# Patient Record
Sex: MALE | Race: WHITE | URBAN - METROPOLITAN AREA
[De-identification: names, ages, dates, MRNs, and addresses within clinical notes are randomized per-mention and may not be internally consistent; named-entity substitution may affect disease eponyms.]

---

## 2017-01-01 ENCOUNTER — HOME CARE/HOSPICE - HEALTHEAST (OUTPATIENT)
Dept: HOSPICE | Facility: HOSPICE | Age: 57
End: 2017-01-01

## 2017-01-01 ENCOUNTER — RECORDS - HEALTHEAST (OUTPATIENT)
Dept: ADMINISTRATIVE | Facility: OTHER | Age: 57
End: 2017-01-01

## 2017-01-01 ENCOUNTER — AMBULATORY - HEALTHEAST (OUTPATIENT)
Dept: HOSPICE | Facility: HOSPICE | Age: 57
End: 2017-01-01

## 2017-01-01 ENCOUNTER — HOSPITAL ENCOUNTER (INPATIENT)
Dept: INTENSIVE CARE | Facility: CLINIC | Age: 57
Discharge: HOSPICE | End: 2017-11-01
Attending: FAMILY MEDICINE | Admitting: FAMILY MEDICINE

## 2017-01-01 DIAGNOSIS — I82.409 DVT (DEEP VENOUS THROMBOSIS) (H): ICD-10-CM

## 2017-01-01 DIAGNOSIS — A41.9 SEVERE SEPSIS (H): ICD-10-CM

## 2017-01-01 DIAGNOSIS — G90.3 NEUROGENIC ORTHOSTATIC HYPOTENSION (H): ICD-10-CM

## 2017-01-01 DIAGNOSIS — R12 HEART BURN: ICD-10-CM

## 2017-01-01 DIAGNOSIS — R65.20 SEVERE SEPSIS (H): ICD-10-CM

## 2017-01-01 DIAGNOSIS — R68.89 SUSPECTED DEEP TISSUE INJURY: ICD-10-CM

## 2017-01-01 DIAGNOSIS — G89.3 CANCER RELATED PAIN: ICD-10-CM

## 2017-01-01 DIAGNOSIS — Z51.5 PALLIATIVE CARE ENCOUNTER: ICD-10-CM

## 2017-01-01 DIAGNOSIS — A41.9 SEPSIS (H): ICD-10-CM

## 2017-01-01 LAB
ATRIAL RATE - MUSE: 103 BPM
BASOPHILS # BLD AUTO: 0 THOU/UL (ref 0–0.2)
BASOPHILS NFR BLD AUTO: 0 % (ref 0–2)
BLD PROD TYP BPU: NORMAL
BLD PROD TYP BPU: NORMAL
BLOOD EXPIRATION DATE: NORMAL
BLOOD EXPIRATION DATE: NORMAL
BLOOD TYPE: 6200
BLOOD TYPE: 6200
CODING SYSTEM: NORMAL
CODING SYSTEM: NORMAL
COMPONENT (HISTORICAL CONVERSION): NORMAL
COMPONENT (HISTORICAL CONVERSION): NORMAL
CROSSMATCH: NORMAL
CROSSMATCH: NORMAL
DIASTOLIC BLOOD PRESSURE - MUSE: 71 MMHG
EOSINOPHIL # BLD AUTO: 0.1 THOU/UL (ref 0–0.4)
EOSINOPHIL NFR BLD AUTO: 3 % (ref 0–6)
ERYTHROCYTE [DISTWIDTH] IN BLOOD BY AUTOMATED COUNT: 20.1 % (ref 11–14.5)
HCT VFR BLD AUTO: 18.8 % (ref 40–54)
HGB BLD-MCNC: 6.2 G/DL (ref 14–18)
INTERPRETATION ECG - MUSE: NORMAL
ISSUE DATE AND TIME: NORMAL
ISSUE DATE AND TIME: NORMAL
LAB AP CHARGES (HE HISTORICAL CONVERSION): NORMAL
LYMPHOCYTES # BLD AUTO: 0.2 THOU/UL (ref 0.8–4.4)
LYMPHOCYTES NFR BLD AUTO: 8 % (ref 20–40)
MCH RBC QN AUTO: 30.4 PG (ref 27–34)
MCHC RBC AUTO-ENTMCNC: 33 G/DL (ref 32–36)
MCV RBC AUTO: 92 FL (ref 80–100)
MONOCYTES # BLD AUTO: 0.2 THOU/UL (ref 0–0.9)
MONOCYTES NFR BLD AUTO: 7 % (ref 2–10)
NEUTROPHILS # BLD AUTO: 2.2 THOU/UL (ref 2–7.7)
NEUTROPHILS NFR BLD AUTO: 82 % (ref 50–70)
P AXIS - MUSE: 49 DEGREES
PATH REPORT.COMMENTS IMP SPEC: NORMAL
PATH REPORT.COMMENTS IMP SPEC: NORMAL
PATH REPORT.FINAL DX SPEC: NORMAL
PATH REPORT.MICROSCOPIC SPEC OTHER STN: ABNORMAL
PATH REPORT.MICROSCOPIC SPEC OTHER STN: NORMAL
PLATELET # BLD AUTO: 76 THOU/UL (ref 140–440)
PMV BLD AUTO: 11.5 FL (ref 8.5–12.5)
PR INTERVAL - MUSE: 122 MS
QRS DURATION - MUSE: 84 MS
QT - MUSE: 300 MS
QTC - MUSE: 393 MS
R AXIS - MUSE: 46 DEGREES
RBC # BLD AUTO: 2.04 MILL/UL (ref 4.4–6.2)
STATUS (HISTORICAL CONVERSION): NORMAL
STATUS (HISTORICAL CONVERSION): NORMAL
SYSTOLIC BLOOD PRESSURE - MUSE: 100 MMHG
T AXIS - MUSE: 36 DEGREES
UNIT ABO/RH (HISTORICAL CONVERSION): NORMAL
UNIT ABO/RH (HISTORICAL CONVERSION): NORMAL
UNIT NUMBER: NORMAL
UNIT NUMBER: NORMAL
VENTRICULAR RATE- MUSE: 103 BPM
WBC: 2.9 THOU/UL (ref 4–11)

## 2017-01-01 RX ORDER — HALOPERIDOL 2 MG/ML
0.5-2 SOLUTION ORAL EVERY 4 HOURS PRN
Status: SHIPPED | COMMUNITY
Start: 2017-01-01

## 2017-01-01 RX ORDER — AMOXICILLIN 250 MG
1 CAPSULE ORAL 2 TIMES DAILY PRN
Status: SHIPPED | COMMUNITY
Start: 2017-01-01

## 2017-01-01 RX ORDER — FENTANYL 75 UG/1
1 PATCH TRANSDERMAL
Status: SHIPPED | COMMUNITY
Start: 2017-01-01

## 2017-01-01 RX ORDER — HYDROMORPHONE HYDROCHLORIDE 1 MG/ML
4 SOLUTION ORAL
Status: SHIPPED | COMMUNITY
Start: 2017-01-01

## 2017-01-01 RX ORDER — ATROPINE SULFATE 10 MG/ML
1-2 SOLUTION/ DROPS OPHTHALMIC EVERY 4 HOURS PRN
Status: SHIPPED | COMMUNITY
Start: 2017-01-01

## 2017-01-01 RX ORDER — LORAZEPAM 2 MG/ML
1 CONCENTRATE ORAL
Status: SHIPPED | COMMUNITY
Start: 2017-01-01

## 2017-01-01 RX ORDER — BISACODYL 10 MG
10 SUPPOSITORY, RECTAL RECTAL PRN
Status: SHIPPED | COMMUNITY
Start: 2017-01-01

## 2017-01-01 RX ORDER — HYDROMORPHONE HYDROCHLORIDE 1 MG/ML
8 SOLUTION ORAL EVERY 4 HOURS
Status: SHIPPED | COMMUNITY
Start: 2017-01-01

## 2017-01-01 RX ORDER — LORAZEPAM 2 MG/ML
1 CONCENTRATE ORAL EVERY 4 HOURS
Status: SHIPPED | COMMUNITY
Start: 2017-01-01

## 2017-01-01 ASSESSMENT — MIFFLIN-ST. JEOR
SCORE: 1468.97
SCORE: 1366.92
SCORE: 1492.56
SCORE: 1487.11
SCORE: 1408.19

## 2021-05-31 VITALS — BODY MASS INDEX: 23.32 KG/M2 | HEIGHT: 65 IN | WEIGHT: 140 LBS

## 2021-05-31 VITALS — BODY MASS INDEX: 29.23 KG/M2 | HEIGHT: 64 IN | WEIGHT: 171.2 LBS

## 2021-06-13 NOTE — PROGRESS NOTES
"      Health system PALLIATIVE CARE PROGRESS NOTE    PATIENT NAME: Shoaib Parish  MRN #: [id number]  DATE OF ADMISSION: 10/29/2017   DATE OF ENCOUNTER: 10/31/2017   REQUESTING PHYSICIAN: Dr. Mackey  PRIMARY CARE PROVIDER: No Primary Care Provider  CONSULTANT: Lashanda Villeda, CNS  VISIT #: [id number]       Impression and Recommendations:  1.  Cancer related pain -patient currently denies all pain, sensation  -Continue home dosing of fentanyl 75 mcg every 72 hours via patch  -Decadron 4 mg p.o. 3 times daily  -Gabapentin 300 mg p.o. twice daily  -If he discharges with hospice, consider rotation from fentanyl patch to methadone.  Fentanyl patch is not covered by hospice.  Was previously on OxyContin and pain was not adequately managed.  Will readdress this tomorrow, based on overall goals/plan     2.  Shortness of breath - respiratory failure, hypoxia and metastasis  -Currently on IV Levaquin  -Oxygen for comfort     3.  Bowel regimen-patient on scheduled opiates  -Charla-Colace 1 tab p.o. twice daily     4.  Palliative care encounter-please see discussion below  -DNR/DNI  -Continue current cares and treatments  -Hospice is following.  Patient and spouse are waiting to hear back from their oncologist regarding her recommendations.  They left her message today and anticipate that she will call back at some point today.  If patient stabilizes enough to discharge, likely will sign on with hospice at discharge.  -Continue to  patient and family and determine goals of care       ADMITTING DIAGNOSIS: Sepsis    CHIEF COMPLAINT: sepsis    SUMMARY: Chart reviewed. Per Lizzy Conde's consult note from 10/30/17, \"Shoaib Parish is a 57 y.o. Male admitted for sepsis and respiratory distress. Mr. 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 His last chemotherapy (Pembrolizumab) was 3 days ago and he also received 3 units PRBCs for anemia secondary to chemo at [facility]. The " "patient presented to the ED 10/29/17 with shortness of breath and difficulty breathing. His wife, Humaira, also reports he appeared to be \"gurgling\" and quite weak. The patient was found to be hypotensive and tachycardic with a temp of 101.3. He was admitted to the ICU with severe sepsis and acute respiratory distress.  CT Chest showed \"evidence for extensive metastatic disease within the supraclavicular region left aspect of the neck, chest, likely bones and liver.\"\"                PATIENT GOALS OF CARE: Patient lying in bed during discussion, periodically appeared to be sleeping.  His wife and brothers Jairo and Jason are present.  Questions were answered regarding his current condition.  We discussed their wishes for care moving forward.  At this time, request is to continue current level of care.  We did discuss the possibility that his condition could decline further while in the hospital and what they would want if this happened.  For example, restarting IV vasopressor medications.  They want to discuss this privately.  We discussed continuing with aggressive treatment as well as transitioning to comfort care and discharging with hospice.  They are waiting to hear back from his oncologist to get her recommendations.  Wife verbalized that oncologist has discussed with them that continuing treatment may cause more harm than good and they are aware that he is likely reaching that point.  We did discuss hospice philosophy and services in detail as the hospice nurse was also present.  They are considering hospice care at home and starting to mentally prepare for this, but at this point are still discussing the most appropriate time to transition to comfort care and discharge with hospice.  Outside the room his brother Jason asked about prognosis and when his mother should visit from [place].  Currently the plan is for her to fly to [place] on Friday.  I did encourage him to have her come to see him as soon as " "possible, as he is very ill and his condition could change at any time.    REVIEW OF SYMPTOMS:  Patient denies pain at this time.  Denies chest pain or shortness of breath, denies nausea or vomiting.  No constipation or diarrhea.  Feels tired and fatigued, difficulty sleeping in the hospital.    ESAS Score:    Depression: None  Nausea: None   Anorexia:  None  Drowsiness:  Mild  Fatigue:  Moderate  Constipation:  None  Dyspnea: None  Agitation:  None  Anxiety:  None  Pain:  None    PAIN SCORE: 0/10  PPS:   30%- 1. Totally bed bound; 2. Unable to do any activity, extensive disease; 3. Total care; 4. Normal or reduced; 5. Full or drowsy +/- confusion    PHYSICAL EXAMINATION:    /71  Pulse 89  Temp 98.2  F (36.8  C) (Oral)   Resp 16  Ht 5' 4\" (1.626 m)  Wt 170 lb (77.1 kg)  SpO2 95%  BMI 29.18 kg/m2  General appearance: alert, appears stated age, cooperative, fatigued and no distress  Head: Normocephalic, without obvious abnormality, atraumatic  Eyes: negative, conjunctivae/corneas clear. PERRL, EOM's intact. Fundi benign.  Nose: Nares normal. Septum midline. Mucosa normal. No drainage or sinus tenderness.  Throat: lips, mucosa, and tongue normal; teeth and gums normal  Neck: no adenopathy, no carotid bruit, no JVD, supple, symmetrical, trachea midline and thyroid not enlarged, symmetric, no tenderness/mass/nodules  Lungs: CTA  Heart: regular rate and rhythm, S1, S2 normal, no murmur, click, rub or gallop  Abdomen: soft, non-tender; bowel sounds normal; no masses,  no organomegaly  Pulses: radial pulses palpable +2  Neurologic: Paraplegia; pt reports no sensation from chest down      LAB:    Results from last 7 days  Lab Units 10/30/17  0755 10/29/17  0148   LN-WHITE BLOOD CELL COUNT thou/uL 2.7* 2.7*   LN-HEMOGLOBIN g/dL 7.2* 8.6*   LN-HEMATOCRIT % 21.6* 25.3*   LN-PLATELET COUNT thou/uL 66* 68*          Results from last 7 days  Lab Units 10/31/17  0446 10/30/17  2059 10/30/17  0755 10/29/17  0148 "   LN-SODIUM mmol/L 139  --  142 133*   LN-POTASSIUM mmol/L 4.1  4.1 4.3 3.0* 4.3   LN-CHLORIDE mmol/L 111*  --  113* 101   LN-CO2 mmol/L 21*  --  20* 22   LN-BLOOD UREA NITROGEN mg/dL 11  --  9 22   LN-CREATININE mg/dL 0.42*  --  0.42* 0.52*   LN-CALCIUM mg/dL 8.2*  --  8.4* 8.9   LN-ALBUMIN g/dL  --   --   --  1.9*   LN-PROTEIN TOTAL g/dL  --   --   --  4.8*   LN-BILIRUBIN TOTAL mg/dL  --   --   --  0.9   LN-ALKALINE PHOSPHATASE U/L  --   --   --  156*   LN-ALT (SGPT) U/L  --   --   --  54*   LN-AST (SGOT) U/L  --   --   --  33         Results from last 7 days  Lab Units 10/29/17  0148   LN-INR  1.18*         I have personally reviewed all pertinent labs.    RADIOLOGIC FINDINGS:  No new results    Lashanda Villeda, [facility]  Office #: [phone]    E/M 35 minutes with > 50% of time during encounter was spent with family and patient on counseling and/or care coordination as documented above. yes

## 2021-06-13 NOTE — PROGRESS NOTES
[facility] PALLIATIVE CARE PROGRESS NOTE    PATIENT NAME: Shoaib Parish  MRN #: [id number]  DATE OF ADMISSION: 10/29/2017   DATE OF ENCOUNTER: 11/1/2017   REQUESTING PHYSICIAN: Dr. Mackey  PRIMARY CARE PROVIDER: No Primary Care Provider  CONSULTANT: Lashanda Villeda, CNS  VISIT #: [id number]    Impression and Recommendations:  1.  Cancer related pain -patient currently denies all pain, sensation  -Continue home dosing of fentanyl 75 mcg every 72 hours via patch and oxycodone 10-20 mg po every 2 hours PRN.   -Decadron 4 mg p.o. 3 times daily.  -Gabapentin 300 mg p.o. twice daily.  -Per wife, patient has a supply of the above medications at home, including at least 8 fentanyl patches.  At this time they would prefer to continue current pain management regimen.  If needing to transition off of fentanyl patch in the future, consider methadone.     2.  Shortness of breath - respiratory failure, hypoxia and metastasis  -Transitioning to oral antibiotics, would like to continue on hospice  -Oxygen for comfort.  Currently not on oxygen  -Opioid medications can also be helpful with shortness of breath.      3.  Bowel regimen-patient on scheduled opiates  -Last bowel movement was last evening.  Has been having more loose stools while on antibiotics.  -Charla-Colace 1 tab p.o. twice daily.  Hold if having loose stools.      4.  Palliative care encounter-please see discussion below  -DNR/DNI  -Discharge to home with hospice services today.  Hospice admission RN is coordinating discharge plan with care management and patient/family.  -Patient and spouse would like to have him continue on Midodrin and Levaquin oral at home.  Recommend discontinuing Lovenox.    These recommendations were discussed with Dr. Mackey.    Barriers to discharge: None  The patient is eligible for discharge from a palliative standpoint at this time.    ADMITTING DIAGNOSIS: Sepsis    CHIEF COMPLAINT: Sepsis    SUMMARY: Chart reviewed. Deandre Ball  "Amaya's consult note from 10/30/17, \"Shoaib Parish is a 57 y.o. Male admitted for sepsis and respiratory distress. Mr. 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 His last chemotherapy (Pembrolizumab) was 3 days ago and he also received 3 units PRBCs for anemia secondary to chemo at [facility]. The patient presented to the ED 10/29/17 with shortness of breath and difficulty breathing. His wife, uHmaira, also reports he appeared to be \"gurgling\" and quite weak. The patient was found to be hypotensive and tachycardic with a temp of 101.3. He was admitted to the ICU with severe sepsis and acute respiratory distress.  CT Chest showed \"evidence for extensive metastatic disease within the supraclavicular region left aspect of the neck, chest, likely bones and liver.\"\"         PATIENT GOALS OF CARE:   Met with patient and his spouse.  After discussions with Dr. Mackey and amongst each other, patient has decided to discharge to home with hospice services.  He verbalized that cancer therapies likely would do more harm than good.  Discussed discharging with hospice today and they plan to meet with hospice admission nurse shortly.  They want to continue with midodrine and oral antibiotic at home.  We also discussed pain management regimen in detail and would like to continue with current plan.  They do have a supply of all of his comfort medications at home, including fentanyl patch.  We discussed that hospice can help with adjust/titrate medications if needed.      REVIEW OF SYMPTOMS:  Patient denies pain at this time.  Denies chest pain or shortness of breath, denies nausea or vomiting.  No constipation or diarrhea.  Feels tired and fatigued, difficulty sleeping in the hospital.     ESAS Score:    Depression: None  Nausea: None   Anorexia:  None  Drowsiness:  Mild  Fatigue:  Moderate  Constipation:  None  Dyspnea: None  Agitation:  None  Anxiety:  None  Pain:  None     PAIN " "SCORE: 0/10  PPS:   30%- 1. Totally bed bound; 2. Unable to do any activity, extensive disease; 3. Total care; 4. Normal or reduced; 5. Full or drowsy +/- confusion    PHYSICAL EXAMINATION:    /71 (Patient Position: Lying)  Pulse 89  Temp 99.9  F (37.7  C) (Oral)   Resp 18  Ht 5' 4\" (1.626 m)  Wt 171 lb 3.2 oz (77.7 kg)  SpO2 91%  BMI 29.39 kg/m2  General appearance: alert, appears stated age, cooperative, fatigued and no distress  Head: Normocephalic, without obvious abnormality, atraumatic  Eyes: negative, conjunctivae/corneas clear. PERRL, EOM's intact. Fundi benign.  Nose: Nares normal. Septum midline. Mucosa normal. No drainage or sinus tenderness.  Throat: lips, mucosa, and tongue normal; teeth and gums normal  Neck: no adenopathy, no carotid bruit, no JVD, supple, symmetrical, trachea midline and thyroid not enlarged, symmetric, no tenderness/mass/nodules  Lungs: CTA  Heart: regular rate and rhythm, S1, S2 normal, no murmur, click, rub or gallop  Abdomen: soft, non-tender; bowel sounds normal; no masses,  no organomegaly  Pulses: radial pulses palpable +2  Neurologic: Paraplegia; pt reports no sensation from chest down    LAB:    Results from last 7 days  Lab Units 10/31/17  1309 10/31/17  1018 10/30/17  0755   LN-WHITE BLOOD CELL COUNT thou/uL 2.9* 2.9* 2.7*   LN-HEMOGLOBIN g/dL 6.2* 6.5* 7.2*   LN-HEMATOCRIT % 18.8* 19.7* 21.6*   LN-PLATELET COUNT thou/uL 76* 73* 66*          Results from last 7 days  Lab Units 10/31/17  1309 10/31/17  0446 10/30/17  2059 10/30/17  0755 10/29/17  0148   LN-SODIUM mmol/L  --  139  --  142 133*   LN-POTASSIUM mmol/L  --  4.1  4.1 4.3 3.0* 4.3   LN-CHLORIDE mmol/L  --  111*  --  113* 101   LN-CO2 mmol/L  --  21*  --  20* 22   LN-BLOOD UREA NITROGEN mg/dL  --  11  --  9 22   LN-CREATININE mg/dL  --  0.42*  --  0.42* 0.52*   LN-CALCIUM mg/dL  --  8.2*  --  8.4* 8.9   LN-ALBUMIN g/dL  --   --   --   --  1.9*   LN-PROTEIN TOTAL g/dL  --   --   --   --  4.8* "   LN-BILIRUBIN TOTAL mg/dL 0.5  --   --   --  0.9   LN-ALKALINE PHOSPHATASE U/L  --   --   --   --  156*   LN-ALT (SGPT) U/L  --   --   --   --  54*   LN-AST (SGOT) U/L  --   --   --   --  33         Results from last 7 days  Lab Units 10/29/17  0148   LN-INR  1.18*         I have personally reviewed all pertinent labs.    RADIOLOGIC FINDINGS:  No new results    Lashanda Villeda, [facility]  Office #: [phone]    E/M 35 minutes with > 50% of time during encounter was spent with family and patient on counseling and/or care coordination as documented above. yes    Prolonged service time beyond 35 minutes was from 10:30 to 11:05, which included discussion about those of care and symptom management.

## 2021-06-13 NOTE — CONSULTS
S: hospice  B: writer and Christy NP from [facility] met with pt, wife Humaira and 2 brothers. Wife beginning to see the graveness of pt illness. Pt and brothers continue to talk about wanting chemo and trying to continue. Pt is receiving 2nd unit of PRBC , /50 resting in bed with oral presser support. Family wants to take pt home when he is medically stable. Answered hospice questions.  A: pt bedbound, has difficulty keeping eyes open for conversation.   R: plan to have hospice follow pt when medically ready for discharge.

## 2021-06-13 NOTE — ED PROVIDER NOTES
eMERGENCY dEPARTMENT eNCOUnter      CHIEF COMPLAINT    Chief Complaint   Patient presents with     Shortness of Breath       HPI    Shoaib Parish is a 57 y.o. male who presents to the ED for evaluation of difficulty breathing which started a few hours prior to his presentation to the emergency department.  His wife noted that his breathing sounded very gurgly.  The patient has a history of metastatic sarcomatoid cancer.  His oncologists are at [facility].  His last chemotherapy was one month ago.  He received an immunosuppressant therapy yesterday.  He was admitted to the [facility] on October 26 for anemia with a hemoglobin of 6.3 and received 3 units of blood.  He was discharged on October 27.  He was started on Levaquin last night due to his fever.  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    DOCUSATE SODIUM (COLACE) 100 MG CAPSULE    Take 100 mg by mouth 2 (two) times a day as needed for constipation.    ENOXAPARIN (LOVENOX) 100 MG/ML SYRG    Inject 100 mg under the skin daily.    FENTANYL (DURAGESIC) 75 MCG/HR    Place 1 patch on the skin every third day.    GABAPENTIN (NEURONTIN) 300 MG CAPSULE    Take 300 mg by mouth 2 (two) times a day.    LEVOFLOXACIN (LEVAQUIN) 500 MG TABLET    Take 500 mg by mouth daily.    LORAZEPAM (ATIVAN) 0.5 MG TABLET    Take 0.5 mg by mouth every 4 (four) hours. 0.5-1.0 mg every 4 hours as needed for anxiety    OXYCODONE (ROXICODONE) 10 MG IMMEDIATE RELEASE TABLET    Take 10-20 mg by mouth every 2 (two) hours as needed for pain.    POLYETHYLENE GLYCOL (MIRALAX) 17 GRAM PACKET    Take 17 g by mouth daily.    PROCHLORPERAZINE (COMPAZINE) 10 MG TABLET    Take 10 mg by mouth every 4 (four) hours as needed for nausea  (every 4-6 hours as needed).    SENNA-DOCUSATE (SENNOSIDES-DOCUSATE SODIUM) 8.6-50 MG TABLET    Take 1 tablet by mouth 2 (two) times a day as needed for constipation.   Modified Medications    No medications on file   Discontinued Medications    No medications on file     Reviewed and nothing pertinent.     ALLERGIES    No Known Allergies    SOCIAL HISTORY     Lives with wife    REVIEW OF SYSTEMS    Constitutional:  Denies fever, chills, excessive weight loss  Eyes:  Denies any vision changes  HENT: Denies sore throat.  Respiratory:  shortness of breath   Cardiovascular:  Denies chest pain or palpitations  GI:  Denies abdominal pain, nausea, vomiting, or change in bowel habits.  : Denies hematuria or dysuria.   Musculoskeletal:  Denies any new muscle/joint pain.    Skin:  Denies rash   Neurologic:  Denies headache, focal weakness or sensory changes   Psych: Mood and affect normal  All systems negative except as marked.     PHYSICAL EXAM    VITAL SIGNS: BP 92/58  Pulse 94  Temp (!) 101.3  F (38.5  C) (Rectal)   Resp 21  Wt 163 lb (73.9 kg)  SpO2 98%   Constitutional:  Drowsyt, in mild distress  HENT:  Normocephalic, Atraumatic, Bilateral external ears normal, Oropharynx moist, Nose normal. Neck- Normal range of motion, No tenderness, Supple, No stridor.   Eyes:  PERRL, EOMI, Conjunctiva normal, No discharge.   Respiratory:   mild respiratory distress, few scattered rhonchi  Cardiovascular: tachycardic, Normal rhythm, No appreciable rubs or gallops.   GI:  Soft, No tenderness, No distension, No palpable masses  Musculoskeletal:  Intact distal pulses, No edema. Good range of motion in all major joints. No tenderness to palpation or major deformities noted.   Integument:  Warm, Dry, No erythema, No rash.   Neurologic:  Lethargic but arousable, lower extremity paralysis.   Psychiatric:  Affect normal, Judgment normal, Mood normal.     EKG    Rate is 103, sinus, there is no ST segment elevation or depression  appreciated.  I have independently reviewed and interpreted this EKG, pending cardiology read.      RADIOLOGY    Please see official radiology report.    Cta Chest Pe Run    Result Date: 10/29/2017  CTA CHEST PE RUN 10/29/2017 3:05 AM INDICATION: Chest pain, acute, PE suspected. TECHNIQUE: Helical acquisition through the chest was performed during the arterial phase of contrast enhancement using IV contrast. 2D and 3D reconstructions were performed by the CT technologist. Dose reduction techniques were used. IV CONTRAST: Iohexol (Omni) 75 mL. COMPARISON: None. FINDINGS: ANGIOGRAM CHEST: Timing of the contrast bolus suboptimal to evaluate for pulmonary emboli. No large or central pulmonary emboli identified. No aortic aneurysm or dissection. LUNGS AND PLEURA: Greater than 20 masses throughout the right hemithorax and larger number within the left hemithorax. Representative lesion left lower lobe which appears to extend through the major fissure measures 4.6 x 2.6 cm on image #85, representative right upper lobe lesion 2.6 x 3.6 cm on image #46, additional confluent lesion left lower lobe measures 4.4 x 3.5 cm on image #101. Small bilateral effusions. Scattered areas of atelectasis, consolidation, and groundglass opacity. MEDIASTINUM: Large bulky left supraclavicular adenopathy incompletely imaged. Representative mass 7.6 x 7.1 cm on the left posteriorly when measured on image #6, additional representative lesion also on the left 5.8 x 4.3 cm on image #3. No mediastinal or hilar adenopathy. Densely calcified 1.8 x 1.4 cm mass left lobe of thyroid gland likely benign. LIMITED UPPER ABDOMEN: At least 2 hepatic masses, largest 4.5 x 3.9 cm in the right lobe on image #268. Hepatic steatosis. MUSCULOSKELETAL: Postoperative changes in thoracic spine. Degenerative disease. Indeterminate right eighth rib lesion. Destruction of portion of proximal right ninth rib which could be postsurgical. Small sclerotic lesions left fifth  and sixth ribs indeterminate.     CONCLUSION: 1.  Evidence for extensive metastatic disease within the supraclavicular region left aspect of the neck, chest, likely bones and liver. If this is an unknown finding PET/CT recommended. 2.  Timing of the contrast bolus suboptimal to evaluate for pulmonary embolus. No pulmonary embolus is identified. No aortic aneurysm or dissection. 3.  Hepatic steatosis.    I have independently reviewed and interpreted the above imaging, pending the final radiology read.    LABS  Results for orders placed or performed during the hospital encounter of 10/29/17   Comprehensive metabolic panel   Result Value Ref Range    Sodium 133 (L) 136 - 145 mmol/L    Potassium 4.3 3.5 - 5.0 mmol/L    Chloride 101 98 - 107 mmol/L    CO2 22 22 - 31 mmol/L    Anion Gap, Calculation 10 5 - 18 mmol/L    Glucose 138 (H) 70 - 125 mg/dL    BUN 22 8 - 22 mg/dL    Creatinine 0.52 (L) 0.70 - 1.30 mg/dL    GFR MDRD Af Amer >60 >60 mL/min/1.73m2    GFR MDRD Non Af Amer >60 >60 mL/min/1.73m2    Bilirubin, Total 0.9 0.0 - 1.0 mg/dL    Calcium 8.9 8.5 - 10.5 mg/dL    Protein, Total 4.8 (L) 6.0 - 8.0 g/dL    Albumin 1.9 (L) 3.5 - 5.0 g/dL    Alkaline Phosphatase 156 (H) 45 - 120 U/L    AST 33 0 - 40 U/L    ALT 54 (H) 0 - 45 U/L   Protime-INR   Result Value Ref Range    INR 1.18 (H) 0.90 - 1.10   Lactic Acid   Result Value Ref Range    Lactic Acid 3.1 (H) 0.5 - 2.2 mmol/L   Magnesium   Result Value Ref Range    Magnesium 1.4 (L) 1.8 - 2.6 mg/dL   BNP(B-type Natriuretic Peptide)   Result Value Ref Range    BNP 23 0 - 49 pg/mL   HM1 (CBC with Diff)   Result Value Ref Range    WBC 2.7 (L) 4.0 - 11.0 thou/uL    RBC 2.81 (L) 4.40 - 6.20 mill/uL    Hemoglobin 8.6 (L) 14.0 - 18.0 g/dL    Hematocrit 25.3 (L) 40.0 - 54.0 %    MCV 90 80 - 100 fL    MCH 30.6 27.0 - 34.0 pg    MCHC 34.0 32.0 - 36.0 g/dL    RDW 20.2 (H) 11.0 - 14.5 %    Platelets 68 (L) 140 - 440 thou/uL    MPV 11.5 8.5 - 12.5 fL   Manual Differential   Result  Value Ref Range    Total Neutrophils % 84 (H) 50 - 70 %    Lymphocytes % 8 (L) 20 - 40 %    Monocytes % 5 2 - 10 %    Eosinophils %  1 0 - 6 %    Basophils % 0 0 - 2 %    Metamyelocytes % 2 (H) <=1 %    Total Neutrophils Absolute 2.3 2.0 - 7.7 thou/ul    Lymphocytes Absolute 0.2 (L) 0.8 - 4.4 thou/uL    Monocytes Absolute 0.1 0.0 - 0.9 thou/uL    Eosinophils Absolute 0.0 0.0 - 0.4 thou/uL    Basophils Absolute 0.0 0.0 - 0.2 thou/uL    Metamyelocytes Absolute 0.1 <=0.1 thou/uL    Platelet Estimate Decreased (!) Normal       ED COURSE & MEDICAL DECISION MAKING    Zosyn 3.37 g and vancomycin 1.25 g IV was administered for initiation of antibiotic therapy for probable sepsis.  Normal saline 2L IV was administered for IV fluid resuscitation.  Tylenol 650 mg p.o. was administered 4 antipyresis.  Magnesium sulfate 2 g IV was administered for replacement of his low magnesium.  The patient received immunosuppressant therapy yesterday for his metastatic cancer.  I think he is septic.  We will admit to the ICU for further evaluation and treatment.  Discussed CODE STATUS with patient and he desires a DNR status.  His wife states that he is too young to die and does not agree with his request.  Discussed case with [facility] service.  Critical care time at the bedside was 30 minutes.    FINAL IMPRESSION    1. Sepsis             Tori Mcfadden MD  10/29/17 0403

## 2021-06-13 NOTE — PROGRESS NOTES
Met with patient and his wife to discuss hospice assistance once he is discharged. I will order medications and oxygen (for prn use per wife's request) to be delivered between 5:00PM and 8:00PM today as per wife's request. Please call me at [phone] if I can be of any assistance with the safe discharge for this patient. I did call [facility] to let them know that we have ordered the meds that had been sent as orders to them.

## 2021-06-13 NOTE — CONSULTS
Hospice consult held with patient, brother, wife, and Pall Care. Discussed hospice philosophy, services, and benefits. Family in agreement with hospice/comfort cares upon discharge. Wife signed paper work for hospice. Anticipate discharge date unknown.Hospice philosphoy, benefits and services reviewed and patient notified of their right to choose their hospice provider. When DC is determined, hospice will assist in setting up the home for discharge to home. Please call Hospice with any updates at: .

## 2021-06-13 NOTE — PROGRESS NOTES
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    Principal Problem:    Sepsis  Active Problems:    Severe sepsis        Plan:   Oncology:  1) Sarcomatoid Carcinoma of unknown primary - involving spinal cord causing paralysis and loss of bladder function  2) Pancytopenia  3) Bilateral DVT  -Transfuse for HGB<7, Plts <50  -Holding on further Pembrolizumab, last received 2 days ago  -Continue Lovenox    C/V:  1) Septic Shock in combination with neurogenic. He runs low at baseline per wife.   -MAP goal >55 so long as patient is mentating and making urine  -Volume resuscitated with 4L fluid  -Highly suspect this is more neurogenic than septic given rapid swings in BP - will try midodrine in a palliative maneuver to get him home  -Stress-dose steroids (on decadron chronically for spinal cord)    ID:  1) Severe sepsis versus Septic shock, ?pneumonia as source. Also possibly a reaction to the Pembrolizumab.  -Awaiting UA  -Vanc/Zosyn/Azithro for atypical coverage as well in an immunocompromised patient - if plan to get him home will need to convert to an oral agent  -Blood cultures pending  -No sputum being produced  -Sacrum with unstageable area-no skin break down--Wound consult.    Pulmonary:  1) HCAP  2) Pulmonary mets  -No O2 requirement currently, watch closely    GI:  1) History of radiation-induced esophagitis  -PPI  - had a good BM yesterday    Palliative:  1) he has a terminal cancer, and it appears to be progressing despite all attempts at chemo and radiation  2) family still holds hope that more doses of chemo will be a \"miracle drug\" but are also realistic that he will never be able walk or control his bowel/bladder again  3) " "Discussed the potential harm with little benefit that chemo is producing, ie it may have created a drug reaction or immunosuppressed him further resulting in this hospitalization  4) If his goal is to make it home, I suspect we have a \"window\" of stability to achieve this.  We can try to augment his blood pressure with PO midodrine.  We could also pick an oral antibiotic, and consult hospice to consider medical transportation and safety regarding his comfort at home.  The caveat with hospice is it may not allow future chemotherapy, but I would contend he will not be strong enough for more chemo for a while, and if he goes on hospice and \"gets better\" he can then go back off for more chemo hypothetically.  I think the primary importance would be to get him out of the hospital before the high chance he would get sicker as he does not want to die in a hospital.    >75 minutes spent critical care time.     Subjective:    Overnight: slept well.  BP required levophed at low dose, upon stopping his blood pressure would fall rapidly, and with slight dose, rise rapidly (40 mm swing)      HPI: 57 y.o. male with history of sarcomatoid carcinoma of unknown primary with rapidly progressing course (just diagnosed 7/2017) with pathologic T9 compression fracture and epidural mets causing paraplegia from T9 presents from home. Patient was sleeping and making gurgling noises, wife noticed this. Found to be febrile in ED with BPs in the 80s. Responsive to fluid but then drifts down again once fluid boluses stop.    His course has been complex. He presented with CT scan 1/2017 that found pulmonary nodules. He then developed weakness and compression fracture at T9 was discovered. Repeat CT scan done in 6/2017 found enlarging pulmonary mets and confirmed T9 vertebral body mass. MRI subsequent found extension of mass into epidural space causing severe spinal canal narrowing. He underwent CT guided biopsy of T9 mass 6/2017 that found high " grade malignant mneoplasm with spindle and epithelioid features. End of June he underwent T8-->T10 decompressive laminectmy with Fusion T7-->T11. He underwent Radiation therapy at T6-->T11 mid July but developed progressive weakness despite this. New Epidural tumor in the left thecal sac at T12-->L1 discovered and no further surgery recommended. He then underwent radiation to T6-->L2. PET done 8/2017 found PET avid soft tissue mass in spinal canal at T7 and ongoing progression of pulmonary masses. He started Carboplatin and paclitaxel in August and continued through 10/2/17. Repeat CT scan done 10/4/17 found possible liver met as well as increase in pulmonary mets and increase in paraspinal soft tissue mass all consistent with progression despite chemotherapy. Over the past few weeks end of September into October, he developed paralysis of his lower extremities and now onset of numbess in arms with weakness in right arm. Ongoing issues with pain.     He last saw Dr. Horta at [place] 10/11 where he underwent 1 dose of radiation. He has also been receiving the Pembrolizumab at [facility].        No current facility-administered medications on file prior to encounter.      No current outpatient prescriptions on file prior to encounter.         Review of Systems: Reviewed and negative aside from that noted in HPI.    Objective:    Vital signs:  Vitals:    10/30/17 0830   BP: 121/73   Pulse: 84   Resp: 11   Temp: 98.5  F (36.9  C)   SpO2: 95%   RA  General appearance: alert, appears stated age and cooperative  Neck: no adenopathy and supple, symmetrical, trachea midline  Lungs: clear to auscultation bilaterally  Heart: regular rate and rhythm, S1, S2 normal, no murmur, click, rub or gallop  Abdomen: soft, non-tender; bowel sounds normal; no masses,  no organomegaly  Extremities: extremities normal, atraumatic, no cyanosis or edema  Skin: Skin color, texture, turgor normal. No rashes or lesions  Neurologic: good  use of hands and arms, oriented and appropriate. Not able to move legs.    Claudio Frost MD      Data    CT chest: personally reviewed. Multiple pulmonary nodules throughout.     Laboratory:  Results for orders placed or performed during the hospital encounter of 10/29/17   Basic Metabolic Panel   Result Value Ref Range    Sodium 142 136 - 145 mmol/L    Potassium 3.0 (L) 3.5 - 5.0 mmol/L    Chloride 113 (H) 98 - 107 mmol/L    CO2 20 (L) 22 - 31 mmol/L    Anion Gap, Calculation 9 5 - 18 mmol/L    Glucose 119 70 - 125 mg/dL    Calcium 8.4 (L) 8.5 - 10.5 mg/dL    BUN 9 8 - 22 mg/dL    Creatinine 0.42 (L) 0.70 - 1.30 mg/dL    GFR MDRD Af Amer >60 >60 mL/min/1.73m2    GFR MDRD Non Af Amer >60 >60 mL/min/1.73m2     Lab Results   Component Value Date    WBC 2.7 (L) 10/30/2017    HGB 7.2 (L) 10/30/2017    HCT 21.6 (L) 10/30/2017    MCV 92 10/30/2017    PLT 66 (L) 10/30/2017     Lactate 3.1-->2.3-->4.1-->3.4-->3.4

## 2021-06-13 NOTE — H&P
"Assessment and Plan:  Probable severe sepsis.  Patient presented initially hypoxic tachycardic and hypotensive.  Lactic acid is elevated at 3.1.  Patient's received aggressive volume resuscitation and aggressive IV antibiotics.  Blood cultures are pending.  Need to obtain urine.  No need for pressors at this point in time.  Dyspnea/hypoxia.  Now resolved.  CT scan of the chest reveals numerous lung lesions but no obvious pneumonia or pulmonary embolism.  Continue supplemental oxygen as needed.  History of bilateral lower extremity DVTs.  Patient's currently on 100 mg of Lovenox every 24 hours.  This should be resumed.  Metastatic sarcomatoid cancer.  He is being treated at [place] and had his most recent infusion of immunosuppressant therapy was yesterday.  Pancytopenia with recent transfusion of 3 units of packed red cells.  Overnight stay in [place] on the 26th the 27th where he received 3 units of packed red cells.  Anemia is believed to be related to chemotherapy.  Hypomagnesemia.  Protocol.  Hyponatremia.  Asymptomatic.  Trend.  Moderate protein calorie malnutrition.  Lactic acidosis.  Recheck lactic acid now.  Trend down to less than 2.  Elevated alkaline phosphatase.  Likely due to hepatic lesions.  Trend.  Paraplegia.  Secondary to spine involvement.  Per patient and wife neurologic symptoms are spreading cephalically.  CODE STATUS.  Patient expressed desire for DNR status in the emergency department.    Chief Complaint:  Respiratory distress.     HPI:    Shoaib Parish is a 57 y.o. old male with a history of highly metastatic sarcomatoid cancer being treated at [place] presents to the ER with sudden onset of shortness of breath.  The patient was in bed and developed some difficulty breathing and was pulling on his shirt like he was short of breath.  His wife noted \"gurgling\".  He had received an infusion yesterday of immunotherapy and also recently was transfused 3 units of packed red cells for anemia felt " to be secondary to chemo.  Developed a fever was started on Levaquin.  In the ER he was found to be tachycardic hypotensive intermittently tachypneic and febrile with a temp of 101.3.  Blood cultures were obtained and the patient was started on broad-spectrum antibiotics.  CT scan of the chest looking for PE was negative.  No obvious infiltrates.  Patient responded to fluid bolus and supplemental oxygen.  At this point time is feeling much better.  Denies any chest pain or shortness of breath.  No lightheadedness.  No nausea vomiting.  He has lost the ability to move his lower extremities due to tumor and has no sensation from about the level of the nipples down.  This may be has been somewhat progressive moving up towards his head.  History is provided by patient and wife       Medical History  There are no active non-hospital problems to display for this patient.    No past medical history on file.     Surgical History  He  has no past surgical history on file.       Social History  Reviewed, and he   is .  Lives in [facility] with his wife.  Retired from [employer].  No smoking.  Occasional alcohol.       Allergies  No Known Allergies Family History  Reviewed, and noncontributory to this admission.          Prior to Admission Medications   Prescriptions Prior to Admission   Medication Sig Dispense Refill Last Dose     dexamethasone (DECADRON) 4 MG tablet Take 4 mg by mouth 3 (three) times a day.        docusate sodium (COLACE) 100 MG capsule Take 100 mg by mouth 2 (two) times a day as needed for constipation.        enoxaparin (LOVENOX) 100 mg/mL Syrg Inject 100 mg under the skin daily.        fentaNYL (DURAGESIC) 75 mcg/hr Place 1 patch on the skin every third day.        gabapentin (NEURONTIN) 300 MG capsule Take 300 mg by mouth 2 (two) times a day.        levoFLOXacin (LEVAQUIN) 500 MG tablet Take 500 mg by mouth daily.   10/28/2017 at PM     LORazepam (ATIVAN) 0.5 MG tablet Take 0.5 mg by mouth every  4 (four) hours. 0.5-1.0 mg every 4 hours as needed for anxiety        oxyCODONE (ROXICODONE) 10 mg immediate release tablet Take 10-20 mg by mouth every 2 (two) hours as needed for pain.        polyethylene glycol (MIRALAX) 17 gram packet Take 17 g by mouth daily.        prochlorperazine (COMPAZINE) 10 MG tablet Take 10 mg by mouth every 4 (four) hours as needed for nausea (every 4-6 hours as needed).        senna-docusate (SENNOSIDES-DOCUSATE SODIUM) 8.6-50 mg tablet Take 1 tablet by mouth 2 (two) times a day as needed for constipation.             Review of Systems:  A 12 point comprehensive review of systems was negative except as noted above in HPI. Physical Exam:  Temp:  [98.8  F (37.1  C)-101.3  F (38.5  C)] 98.8  F (37.1  C)  Heart Rate:  [] 82  Resp:  [16-26] 26  BP: ()/(55-73) 98/64         General Appearance:  Alert, cooperative, no distress, appears stated age   Head:  Normocephalic, without obvious abnormality, atraumatic   Eyes:  PERRL, conjunctiva/corneas clear, EOM's intact   Nose: Nares normal, septum midline, mucosa normal, no drainage   Throat: Lips, mucosa, and tongue normal; teeth and gums normal   Neck: Supple, symmetrical, trachea midline, no adenopathy, thyroid: not enlarged, symmetric, no carotid bruit or JVD   Back:   Symmetric, no curvature, ROM normal, no CVA tenderness   Lungs:   Clear to auscultation bilaterally, respirations unlabored   Chest Wall:  No tenderness or deformity   Heart:  Regular rate and rhythm, S1, S2 normal,no murmur, rub or gallop   Abdomen:   Soft, non-tender, bowel sounds active all four quadrants,  no masses, no organomegaly   Extremities: Extremities normal, atraumatic, 1+ edema   Skin: Skin color, texture, turgor normal, no rashes or lesions   Neurologic:  Paraplegia, some muscle wasting noted in the upper extremities     Pertinent Labs  Personally reviewed.  Results for orders placed or performed during the hospital encounter of 10/29/17    Comprehensive metabolic panel   Result Value Ref Range    Sodium 133 (L) 136 - 145 mmol/L    Potassium 4.3 3.5 - 5.0 mmol/L    Chloride 101 98 - 107 mmol/L    CO2 22 22 - 31 mmol/L    Anion Gap, Calculation 10 5 - 18 mmol/L    Glucose 138 (H) 70 - 125 mg/dL    BUN 22 8 - 22 mg/dL    Creatinine 0.52 (L) 0.70 - 1.30 mg/dL    GFR MDRD Af Amer >60 >60 mL/min/1.73m2    GFR MDRD Non Af Amer >60 >60 mL/min/1.73m2    Bilirubin, Total 0.9 0.0 - 1.0 mg/dL    Calcium 8.9 8.5 - 10.5 mg/dL    Protein, Total 4.8 (L) 6.0 - 8.0 g/dL    Albumin 1.9 (L) 3.5 - 5.0 g/dL    Alkaline Phosphatase 156 (H) 45 - 120 U/L    AST 33 0 - 40 U/L    ALT 54 (H) 0 - 45 U/L   Protime-INR   Result Value Ref Range    INR 1.18 (H) 0.90 - 1.10   Lactic Acid   Result Value Ref Range    Lactic Acid 3.1 (H) 0.5 - 2.2 mmol/L   Magnesium   Result Value Ref Range    Magnesium 1.4 (L) 1.8 - 2.6 mg/dL   BNP(B-type Natriuretic Peptide)   Result Value Ref Range    BNP 23 0 - 49 pg/mL   HM1 (CBC with Diff)   Result Value Ref Range    WBC 2.7 (L) 4.0 - 11.0 thou/uL    RBC 2.81 (L) 4.40 - 6.20 mill/uL    Hemoglobin 8.6 (L) 14.0 - 18.0 g/dL    Hematocrit 25.3 (L) 40.0 - 54.0 %    MCV 90 80 - 100 fL    MCH 30.6 27.0 - 34.0 pg    MCHC 34.0 32.0 - 36.0 g/dL    RDW 20.2 (H) 11.0 - 14.5 %    Platelets 68 (L) 140 - 440 thou/uL    MPV 11.5 8.5 - 12.5 fL   Manual Differential   Result Value Ref Range    Total Neutrophils % 84 (H) 50 - 70 %    Lymphocytes % 8 (L) 20 - 40 %    Monocytes % 5 2 - 10 %    Eosinophils %  1 0 - 6 %    Basophils % 0 0 - 2 %    Metamyelocytes % 2 (H) <=1 %    Total Neutrophils Absolute 2.3 2.0 - 7.7 thou/ul    Lymphocytes Absolute 0.2 (L) 0.8 - 4.4 thou/uL    Monocytes Absolute 0.1 0.0 - 0.9 thou/uL    Eosinophils Absolute 0.0 0.0 - 0.4 thou/uL    Basophils Absolute 0.0 0.0 - 0.2 thou/uL    Metamyelocytes Absolute 0.1 <=0.1 thou/uL    Platelet Estimate Decreased (!) Normal         Pertinent Radiology  Radiology Results: Cta Chest Pe Run    Result  Date: 10/29/2017  CTA CHEST PE RUN 10/29/2017 3:05 AM INDICATION: Chest pain, acute, PE suspected. TECHNIQUE: Helical acquisition through the chest was performed during the arterial phase of contrast enhancement using IV contrast. 2D and 3D reconstructions were performed by the CT technologist. Dose reduction techniques were used. IV CONTRAST: Iohexol (Omni) 75 mL. COMPARISON: None. FINDINGS: ANGIOGRAM CHEST: Timing of the contrast bolus suboptimal to evaluate for pulmonary emboli. No large or central pulmonary emboli identified. No aortic aneurysm or dissection. LUNGS AND PLEURA: Greater than 20 masses throughout the right hemithorax and larger number within the left hemithorax. Representative lesion left lower lobe which appears to extend through the major fissure measures 4.6 x 2.6 cm on image #85, representative right upper lobe lesion 2.6 x 3.6 cm on image #46, additional confluent lesion left lower lobe measures 4.4 x 3.5 cm on image #101. Small bilateral effusions. Scattered areas of atelectasis, consolidation, and groundglass opacity. MEDIASTINUM: Large bulky left supraclavicular adenopathy incompletely imaged. Representative mass 7.6 x 7.1 cm on the left posteriorly when measured on image #6, additional representative lesion also on the left 5.8 x 4.3 cm on image #3. No mediastinal or hilar adenopathy. Densely calcified 1.8 x 1.4 cm mass left lobe of thyroid gland likely benign. LIMITED UPPER ABDOMEN: At least 2 hepatic masses, largest 4.5 x 3.9 cm in the right lobe on image #268. Hepatic steatosis. MUSCULOSKELETAL: Postoperative changes in thoracic spine. Degenerative disease. Indeterminate right eighth rib lesion. Destruction of portion of proximal right ninth rib which could be postsurgical. Small sclerotic lesions left fifth and sixth ribs indeterminate.     CONCLUSION: 1.  Evidence for extensive metastatic disease within the supraclavicular region left aspect of the neck, chest, likely bones and liver.  If this is an unknown finding PET/CT recommended. 2.  Timing of the contrast bolus suboptimal to evaluate for pulmonary embolus. No pulmonary embolus is identified. No aortic aneurysm or dissection. 3.  Hepatic steatosis.    EKG Results: personally reviewed.     Advanced Care Planning  Discharge Planning discussed with ER provider.  Anticipated Length of Stay in midnights and medical necessity (including a midnight in the Emergency Department after triage if applicable): Due to hypoxia, hypotension, and probable severe sepsis patient will require greater than 2 midnights in the hospital.  Patient will be made inpatient status.

## 2021-06-13 NOTE — DISCHARGE SUMMARY
[facility] MEDICINE  DISCHARGE SUMMARY     Primary Care Physician: No Primary Care Provider  Admission Date: 10/29/2017   Discharge Provider: Leif Mackey Discharge Date: 11/1/2017   Diet: regular diet   Code Status: DNR   Activity: activity as tolerated        Condition at Discharge: Good      REASON FOR PRESENTATION(See Admission Note for Details)   Respiratory distress    PRINCIPAL & ACTIVE DISCHARGE DIAGNOSES     Principal Problem:    Sepsis  Active Problems:    Severe sepsis    Neurogenic orthostatic hypotension    Palliative care encounter    Generalized weakness    Cancer related pain    Shortness of breath    Pancytopenia    Anemia of chronic disease.     SIGNIFICANT FINDINGS (Imaging, labs):   Cta Chest [place] Run  Result Date: 10/29/2017  CONCLUSION: 1.  Evidence for extensive metastatic disease within the supraclavicular region left aspect of the neck, chest, likely bones and liver. If this is an unknown finding PET/CT recommended. 2.  Timing of the contrast bolus suboptimal to evaluate for pulmonary embolus. No pulmonary embolus is identified. No aortic aneurysm or dissection. 3.  Hepatic steatosis.     Results for SAHIL BROOKS (MRN [id number]) as of 11/1/2017 13:05   Ref. Range 11/1/2017 06:50   WBC Latest Ref Range: 4.0 - 11.0 thou/uL 3.7 (L)   RBC Latest Ref Range: 4.40 - 6.20 mill/uL 2.96 (L)   Hemoglobin Latest Ref Range: 14.0 - 18.0 g/dL 8.7 (L)   Hematocrit Latest Ref Range: 40.0 - 54.0 % 25.8 (L)   MCV Latest Ref Range: 80 - 100 fL 87   MCH Latest Ref Range: 27.0 - 34.0 pg 29.4   MCHC Latest Ref Range: 32.0 - 36.0 g/dL 33.7   RDW Latest Ref Range: 11.0 - 14.5 % 20.1 (H)   Platelets Latest Ref Range: 140 - 440 thou/uL 85 (L)   MPV Latest Ref Range: 8.5 - 12.5 fL 10.6       PENDING LABS      Order Current Status    Blood Culture 1st In process    Blood Culture Tubes In process    Culture, Blood In process    Culture, Blood In process    [place] Draw In process           PROCEDURES ( this hospitalization only)      * No surgery found *    RECOMMENDATION FOR F/U VISIT     None.    DISPOSITION     Home with hospice.     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  Patient was first diagnosed in July 2017.  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  He presented with complaints of worsening shortness of breath.  He was febrile in the emergency room and blood pressures were low in the 80s systolic.  Patient received IV fluids and had temporary response and then drift back down to the 70s-80s systolic.  Prior to admission patient has been receiving pembrolizumab at [facility] and required 3 units of packed red blood cells for severe anemia.    Patient was admitted to the ICU.  He was treated eventually for septic shock.  Patient's hypotension was felt to have some neurogenic component given his metastatic disease.  Patient was volume resuscitated with 4 L of fluid.  He was initially placed on pressors and later midodrine was started in a palliative maneuver to maintain blood pressure and for help with transition out of hospital to eventual palliative care and hospice.  Patient was initially on stress dose steroids and later transitioned back to Decadron.  Patient sepsis was felt to be likely secondary to pneumonia source but no confirmed source was found during hospitalization.  It was felt he could also be possible reaction to Pembrolizumab but this was also felt to be less likely given no obvious infiltrates noted on chest CT scan.  He was initially on Vanco and Zosyn and azithromycin for atypical coverage and immunocompromised patient.  He was later converted to Levaquin daily here and he remained afebrile, blood pressures were stable between 90s and 100 systolic.  This seems to be back to baseline according to patient and  family.  Overall patient felt well at the time of discharge she was not requiring supplemental oxygen.  Patient's anoxic Teresa was held during hospitalization due to low platelets were which were likely secondary to sepsis.  Patient had pancytopenia and required 2 further units of packed red blood cells.  There was no evidence of hemolysis, peripheral blood smear and iron studiesare consistent with likely anemia of chronic disease.    Many family discussions were held during patient's hospitalization.  Despite patient's best attempts at chemotherapy and radiation therapy his cancer continues to progress.  Patient is now starting to have infections associated with chemotherapy.  After discussion with patient and family they wished to return to home and initiate hospice therapy.    Discharge Medications with Med changes:        Medication List      START taking these medications          midodrine 2.5 MG tablet   Quantity:  42 tablet   Dose:  2.5 mg   Commonly known as:  PROAMATINE   2.5 mg, Oral, TID with meals       omeprazole 20 MG capsule   Quantity:  30 capsule   Dose:  20 mg   Start taking on:  11/2/2017   Commonly known as:  PriLOSEC   20 mg, Oral, QAM AC         CHANGE how you take these medications          levoFLOXacin 750 MG tablet   Quantity:  7 tablet   Dose:  750 mg   Commonly known as:  LEVAQUIN   750 mg, Oral, DAILY   What changed:    - medication strength  - how much to take         CONTINUE taking these medications          dexamethasone 4 MG tablet   Dose:  4 mg   Commonly known as:  DECADRON   4 mg, Oral, TID       docusate sodium 100 MG capsule   Dose:  100 mg   Commonly known as:  COLACE   100 mg, Oral, BID PRN       fentaNYL 75 mcg/hr   Dose:  1 patch   Commonly known as:  DURAGESIC   1 patch, Transdermal, Q72H       gabapentin 300 MG capsule   Dose:  300 mg   Commonly known as:  NEURONTIN   300 mg, Oral, BID       LORazepam 0.5 MG tablet   Dose:  0.5-1 mg   Commonly known as:  ATIVAN   0.5-1  "mg, Oral, Q4H PRN       oxyCODONE 10 mg immediate release tablet   Dose:  10-20 mg   Commonly known as:  ROXICODONE   10-20 mg, Oral, Q2H PRN       polyethylene glycol 17 gram packet   Dose:  17 g   Commonly known as:  MIRALAX   17 g, Oral, Daily PRN       prochlorperazine 10 MG tablet   Dose:  10 mg   Commonly known as:  COMPAZINE   10 mg, Oral, Q4H PRN       sennosides-docusate sodium 8.6-50 mg tablet   Dose:  1 tablet   Generic drug:  senna-docusate   1 tablet, Oral, BID PRN         STOP taking these medications          enoxaparin 100 mg/mL Syrg   Commonly known as:  LOVENOX                 Rationale for medication changes:      Stopped enoxaparin for thrombocytopenia.   Midodrine for suspected neurogenic hypotension        Consult/s:  pulmonary/intensive care and palliative medicine      Discharge Orders  Referral to Hospice   Referral Priority: Routine Referral Type: Hospice   Referral Reason: Evaluation and Treatment Referral Location: [facility]   Requested Specialty: Hospice Agency / Service    Number of Visits Requested: 1 Expiration Date: 11/01/19     Activity as tolerated   Order Comments: Rest when tired     Discharge diet - Regular     Call MD:  if symptoms get worse     WOC Wound Care Coccyx; Every 5 days   Standing Status: Future  Standing Exp. Date: 11/01/18   Order Comments: Cleanse with: Tap water, pat dry.  Apply: Mepilex 4x4.   Order Specific Question Answer Comments   Site: Coccyx    Frequency Every 5 days        Examination     Vital Signs in last 24 hours:   Temp:  [97.8  F (36.6  C)-99.9  F (37.7  C)] 99.9  F (37.7  C)  Heart Rate:  [77-98] 89  Resp:  [16-18] 18  BP: ()/(55-78) 109/71  SpO2:  [91 %-98 %] 91 %  /71 (Patient Position: Lying)  Pulse 89  Temp 99.9  F (37.7  C) (Oral)   Resp 18  Ht 5' 4\" (1.626 m)  Wt 171 lb 3.2 oz (77.7 kg)  SpO2 91%  BMI 29.39 kg/m2  General appearance: alert, appears stated age and cooperative  Head: Normocephalic, without obvious " abnormality, atraumatic  Eyes: conjunctivae/corneas clear. PERRL, EOM's intact. Fundi benign.  Throat: lips, mucosa, and tongue normal; teeth and gums normal  Neck: no adenopathy, no carotid bruit, no JVD, supple, symmetrical, trachea midline and thyroid not enlarged, symmetric, no tenderness/mass/nodules  Lungs: clear to auscultation bilaterally  Chest wall: no tenderness  Heart: regular rate and rhythm, S1, S2 normal, no murmur, click, rub or gallop  Abdomen: soft, non-tender; bowel sounds normal; no masses,  no organomegaly  Extremities: no ulcers, gangrene or trophic changes  Skin: Skin color, texture, turgor normal. No rashes or lesions  Neurologic: moving upper extremities, alert and oriented x3.       Please see EMR for more detailed significant labs, imaging, consultant notes etc.  Total time spent was greater than 30 minutes, greater that 50% of this time was spent in direct evaluation of patient.    Leif Mackey DO, MS  [facility] Service  Internal Medicine    CC:No Primary Care Provider

## 2021-06-13 NOTE — PROGRESS NOTES
Pharmacy Consult: Vancomycin Dosing in the Emergency Department    Pharmacist consulted by Dr Posada to dose vancomycin for Shoaib Parish, a 57 y.o. male.    Indication for vancomycin therapy: Sepsis    Other current antimicrobials             vancomycin 1.25 g in sodium chloride 0.9% 500 mL (VANCOCIN)  Once          piperacillin-tazobactam IVPB 3.375 g (ZOSYN)  Once             Assessment/Plan    1. Vancomycin 1250 mg IV once in the ED (16.9 mg/kg actual body weight).  2. If the patient is admitted to the hospital and vancomycin therapy should continue, please re-consult pharmacy.    Subjective/Objective    Shoaib Parish presented to the ED on 10/29/2017 for Shortness of Breath    Height:    Actual Body Weight (ABW): 73.9 kg (163 lb)    Patient height not recorded    BMI: There is no height or weight on file to calculate BMI.    No Known Allergies    There is no problem list on file for this patient.   No past medical history on file.     Temp Readings from Current Encounter:     10/29/17 0138   Temp: (!) 101.3  F (38.5  C)       No results for input(s): WBC, NEUTROABS, ANCA, LACTICACID, PROCAL, CRP in the last 72 hours.  No results for input(s): BUN, CREATININE in the last 72 hours.    CrCl cannot be calculated (No order found.).    Thank you for the consult,  Scottie Barragan, PharmD  10/29/2017  2:04 AM

## 2021-06-13 NOTE — PROGRESS NOTES
[facility] Daily Progress Note    Assessment/Plan:  57-year-old male with sarcomatoid metastatic cancer, appears terminal.  Patient is currently on pressor support for septic shock.  Patient has presumed healthcare associated pneumonia.  Palliative care and hospice will be consulted today.  Patient and family indicated he would not want to die in the hospital.    Septic shock. No source confirmed.  Lactic acidosis.   Suspect pneumonia as source. Treat as HCAP.   Lactic acid stable 3.4.   MAPs > 55.   Continue Zosyn, azithromycin and Vancomycin IV.   hydrocortisone 100mg q8h (home dose 4mg po q8h).   Appreciate intensivist recommendations.  Blood cultures NGTD  Awaiting UA and culture.      Respiratory failure, with hypoxia.   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   No evidence of pneumonia or PE on CT.   Continue supplemental oxygen.   Requested [place] records regarding cancer treatment.      Sacral redness.   Wound consultation.     Pancytopenia. Likely secondary to chemotherapy.   Recent transfusion 3 units of PRBCs during hospitalization at [place] on 26th-27th.      Elevated alkaline phosphatase, mild elevation in liver transaminases.   Likely secondary to metastatic disease.      Moderate protein calorie malnutrition     FEN:  Hypomag, replaced per protocol.   Hyponatremia: resolved.   Continue NS 150cc/hr.   Norepinephrine 1mcg/min.     Paraplegia  Secondary to metastatic lesions of spine.      History of DVT  Continue lovenox 100mg subcu Q24h.      Chronic pain  Continue fentanyl, gabapentin, oxycodone prn.     Principal Problem:    Sepsis  Active Problems:    Severe sepsis    Neurogenic orthostatic hypotension     LOS: 1 day     Barriers to discharge: Clinical improvement, question outpatient hospice, palliative consultation, hospice consultation  Discharge Disposition: Likely home with hospice    Subjective:  Patient is lying on his right side.  He indicates that he is comfortable.   Denies any chest pain, shortness of breath, cough.  Denies headache or visual changes.  Denies nausea or vomiting.  Patient's denies any significant pain.  He is not anxious.      azithromycin  500 mg Intravenous Q24H     enoxaparin  100 mg Subcutaneous DAILY     fentaNYL  1 patch Transdermal Q72H     gabapentin  300 mg Oral BID     hydrocortisone sod succ  100 mg Intravenous Q8H     magnesium oxide  400 mg Oral TID     midodrine  2.5 mg Oral TID with meals     omeprazole  20 mg Oral QAM AC     piperacillin-tazobactam (ZOSYN) IV  3.375 g Intravenous Q8H     senna-docusate  1 tablet Oral BID     sodium chloride  10-30 mL Intravenous Q8H FIXED TIMES     sodium chloride  3 mL Intravenous Line Care     vancomycin  15 mg/kg Intravenous Q12H       Objective:  Vital signs in last 24 hours:  Temp:  [97.8  F (36.6  C)-99.1  F (37.3  C)] 98.5  F (36.9  C)  Heart Rate:  [] 84  Resp:  [10-46] 11  BP: ()/(44-78) 121/73  Weight:   Weight:   Wt Readings from Last 1 Encounters:   10/30/17 0430 166 lb (75.3 kg)   10/29/17 0510 152 lb 9.6 oz (69.2 kg)   10/29/17 0138 163 lb (73.9 kg)     Weight change: 3 lb (1.361 kg)  Body mass index is 28.49 kg/(m^2).    Intake/Output last 3 shifts:  I/O last 3 completed shifts:  In: 3042.5 [P.O.:100; I.V.:2002.5; IV Piggyback:940]  Out: -   Intake/Output this shift:       Review of Systems:   As per subjective, all others negative.    Physical Exam:    General Appearance:  Alert, cooperative, no distress, appears stated age   Head:  Normocephalic, without obvious abnormality, atraumatic   Back:   Symmetric, no curvature, ROM normal, no CVA tenderness   Lungs:   Clear to auscultation bilaterally, respirations unlabored   Chest Wall:  No tenderness or deformity   Heart:  Regular rate and rhythm, S1, S2 normal,no murmur, rub or gallop   Abdomen:   Soft, non-tender, bowel sounds active all four quadrants,  no masses, no organomegaly   Extremities: Extremities normal, atraumatic, no  cyanosis or edema   Skin: Skin color, texture, turgor normal, no rashes or lesions   Neurologic: Alert and oriented X 3, Moves all 4 extremities     Imaging:  Personally Reviewed.  Cta Chest [place] Run    Result Date: 10/29/2017  CTA CHEST PE RUN 10/29/2017 3:05 AM INDICATION: Chest pain, acute, PE suspected. TECHNIQUE: Helical acquisition through the chest was performed during the arterial phase of contrast enhancement using IV contrast. 2D and 3D reconstructions were performed by the CT technologist. Dose reduction techniques were used. IV CONTRAST: Iohexol (Omni) 75 mL. COMPARISON: None. FINDINGS: ANGIOGRAM CHEST: Timing of the contrast bolus suboptimal to evaluate for pulmonary emboli. No large or central pulmonary emboli identified. No aortic aneurysm or dissection. LUNGS AND PLEURA: Greater than 20 masses throughout the right hemithorax and larger number within the left hemithorax. Representative lesion left lower lobe which appears to extend through the major fissure measures 4.6 x 2.6 cm on image #85, representative right upper lobe lesion 2.6 x 3.6 cm on image #46, additional confluent lesion left lower lobe measures 4.4 x 3.5 cm on image #101. Small bilateral effusions. Scattered areas of atelectasis, consolidation, and groundglass opacity. MEDIASTINUM: Large bulky left supraclavicular adenopathy incompletely imaged. Representative mass 7.6 x 7.1 cm on the left posteriorly when measured on image #6, additional representative lesion also on the left 5.8 x 4.3 cm on image #3. No mediastinal or hilar adenopathy. Densely calcified 1.8 x 1.4 cm mass left lobe of thyroid gland likely benign. LIMITED UPPER ABDOMEN: At least 2 hepatic masses, largest 4.5 x 3.9 cm in the right lobe on image #268. Hepatic steatosis. MUSCULOSKELETAL: Postoperative changes in thoracic spine. Degenerative disease. Indeterminate right eighth rib lesion. Destruction of portion of proximal right ninth rib which could be postsurgical. Small  sclerotic lesions left fifth and sixth ribs indeterminate.     CONCLUSION: 1.  Evidence for extensive metastatic disease within the supraclavicular region left aspect of the neck, chest, likely bones and liver. If this is an unknown finding PET/CT recommended. 2.  Timing of the contrast bolus suboptimal to evaluate for pulmonary embolus. No pulmonary embolus is identified. No aortic aneurysm or dissection. 3.  Hepatic steatosis.      Lab Results:  Personally Reviewed.     Results from last 7 days  Lab Units 10/30/17  0755 10/29/17  0148   LN-WHITE BLOOD CELL COUNT thou/uL 2.7* 2.7*   LN-HEMOGLOBIN g/dL 7.2* 8.6*   LN-HEMATOCRIT % 21.6* 25.3*   LN-PLATELET COUNT thou/uL 66* 68*       Results from last 7 days  Lab Units 10/30/17  0755 10/29/17  0148   LN-SODIUM mmol/L 142 133*   LN-POTASSIUM mmol/L 3.0* 4.3   LN-CHLORIDE mmol/L 113* 101   LN-CO2 mmol/L 20* 22   LN-BLOOD UREA NITROGEN mg/dL 9 22   LN-CREATININE mg/dL 0.42* 0.52*   LN-CALCIUM mg/dL 8.4* 8.9   LN-ALBUMIN g/dL  --  1.9*   LN-PROTEIN TOTAL g/dL  --  4.8*   LN-BILIRUBIN TOTAL mg/dL  --  0.9   LN-ALKALINE PHOSPHATASE U/L  --  156*   LN-ALT (SGPT) U/L  --  54*   LN-AST (SGOT) U/L  --  33       Results from last 7 days  Lab Units 10/29/17  0148   LN-INR  1.18*       Total time spent was greater than 35 minutes, greater that 50% of this time was spent in direct evaluation of patient.    Leif Mackey DO, MS  [facility] Service  Internal Medicine

## 2021-06-13 NOTE — CONSULTS
Inpatient Palliative Care Consult      Shoaib Parish,  1960, MRN [id number]    Admitting Dx: Sepsis [A41.9]    PCP: No Primary Care Provider, None   Code status:  DNR       Extended Emergency Contact Information  Primary Emergency Contact: Humaira Parish  Address: [address] [place] [address] of [place]  Home Phone: [phone]  Relation: Spouse            Principal Problem:    Sepsis  Active Problems:    Severe sepsis    Neurogenic orthostatic hypotension      Impression and Recommendations:  1.  Cancer related pain -patient currently denies all pain, sensation  -Continue home dosing of fentanyl 75 mcg every 72 hours via patch  -Decadron 4 mg p.o. 3 times daily  - Gabapentin 300 mg p.o. twice daily    2.  Shortness of breath - respiratory failure, hypoxia and metastasis  -Zosyn 3.375 g IV every 8 hours and vancomycin 1000 mg IV every 12 hours  -Oxygen for comfort    3.  Bowel regimen-patient on scheduled opiates  -Charla-Colace 1 tab p.o. twice daily    4.  Palliative care encounter-please see discussion below  -CODE STATUS was discussed and patient has decided DO NOT RESUSCITATE and DO NOT INTUBATE.  -Continue to  patient and family and determine goals of care   Barriers to Discharge: Mr. Parish is not eligible for discharge from a palliative standpoint at this time.    These recommendations were discussed with Dr. Mackey who is in agreement and asked that I place them as orders, which I am glad to do.     Chief Complaint Sepsis       HPI    We have been requested by Dr. Mackey  to evaluate Shoaib Parish for goals of care discussion and symptom management.     Summary:    Shoaib Parish is a 57 y.o. Male admitted for sepsis and respiratory distress. Mr. 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 His last chemotherapy (Pembrolizumab) was 3 days ago and he also received 3 units PRBCs for anemia  "secondary to chemo at [facility]. The patient presented to the ED 10/29/17 with shortness of breath and difficulty breathing. His wife, Humaira, also reports he appeared to be \"gurgling\" and quite weak. The patient was found to be hypotensive and tachycardic with a temp of 101.3. He was admitted to the ICU with severe sepsis and acute respiratory distress.  CT Chest showed \"evidence for extensive metastatic disease within the supraclavicular region left aspect of the neck, chest, likely bones and liver.\"                                                      Consultative Services:  Critical care/pulmonology and Hospice                                                                                                                          Patient's understanding of current medical condition:  The patient states he understands his situation \"is serious\" yet remains hopeful. The patient and his wife state they are waiting to talk to his oncologist, Dr. Garcia at [facility] to get finalization of \"the big picture.\" The patient is hopeful he would be able to resume chemo treatment but appears realistic that it is not likely. He is hoping to discharge home but understands he may have to remain in the hospital if too weak or unable to transition home.    Recent changes in patient's condition: The patient remains stable with blood pressures in the 90's over 50's but requires pressors and norepinephrine to remain stable. More will be determined over the next couple of days as we see how he responds to antibiotic therapy.    Family's concerns: The patient has two brothers and his wife in the room. They are supportive and ask appropriate questions. Their main concern is that he will be strong enough to discharge home and they would like him to remain free of pain. The patient's brother, Jason, expresses concern about his mother who lives in [place]. He is trying to coordinate her visit without \"worrying her too much\" as he describes her as " fretful and ansious. She is currently unaware of the serious nature of the patient's condition.     Patient's/family's goals of care: Immediate goals are to ensure patient is comfortable and see how he responds to interventions before making     Advanced Care Plans/Health Care Directive: The patient's code status is currently DNR/I.  He does not have a HCD on file.    Surrogate Decisionmaker (if no documented/appointed agents): His wife, Humaira Parish    Spiritual History:  The patient denies being a spiritual or Confucianist person and declines referral for spiritual support. His wife states she was thinking of contacting the  who performed their wedding 27 years ago for supportive care.    Patient demonstrates decision-making capacity; he does demonstrate understanding of relevant information about condition, the available test and treatment options, use reason to make a decision about these, and communicate choice about these. (, 4, p. 420-4).    Patient does meet criteria for Hospice Medicare benefit. Diagnosis of metastatic sarcomatoid cancer. He does meet criteria for inpatient hospice.       Medical History  There are no active non-hospital problems to display for this patient.    Past Medical History:   Diagnosis Date     Carcinomatous meningitis      DVT, bilateral lower limbs      Multiple pulmonary nodules      Paraplegia at T9 level      Radiation-induced esophagitis      Sarcoma of bone     Surgical History  He  has a past surgical history that includes Laminectomy (2017); Spinal fusion (2017); and PICC Team Line Insertion (10/29/2017).   Social History  Reviewed, and he  reports that he has never smoked. He has never used smokeless tobacco. He reports that he does not drink alcohol or use illicit drugs.    to wife Humaira for 27 years.   Works at [employer] and has for last 40 years.  Moved here from [place] at age of 9 years.   Allergies  No Known Allergies Family History  Reviewed, and  family history includes Cancer in his maternal aunt.   Psychosocial Needs  Social History     Social History Narrative     No narrative on file     Additional psychosocial needs reviewed per nursing assessment.     Medications & Allergies   Medications:     Current Facility-Administered Medications:      acetaminophen suppository 650 mg (TYLENOL), 650 mg, Rectal, Q6H PRN, Steven German MD     acetaminophen tablet 500-1,000 mg (TYLENOL), 500-1,000 mg, Oral, Q4H PRN, Steven German MD     azithromycin 500 mg in dextrose 5% 250 mL (ZITHROMAX), 500 mg, Intravenous, Q24H, Graciela Langston MD, Last Rate: 250 mL/hr at 10/30/17 0938, 500 mg at 10/30/17 0938     bacitracin ointment packet 1 packet, 1 packet, Topical, Once PRN, Graciela Langston MD     bisacodyl suppository 10 mg (DULCOLAX), 10 mg, Rectal, Daily PRN, Steven German MD     enoxaparin syringe 100 mg (LOVENOX), 100 mg, Subcutaneous, DAILY, Leif Mackey DO, 100 mg at 10/30/17 0938     fentaNYL 75 mcg/hr 1 patch (DURAGESIC), 1 patch, Transdermal, Q72H, Leif Mackey DO     gabapentin capsule 300 mg (NEURONTIN), 300 mg, Oral, BID, Leif Mackey DO, 300 mg at 10/30/17 0918     hydrocortisone sod succ (PF) 100 mg/2 mL injection 100 mg, 100 mg, Intravenous, Q8H, Graciela Langston MD, 100 mg at 10/30/17 0918     LORazepam tablet 0.5-1 mg (ATIVAN), 0.5-1 mg, Oral, Q4H PRN, Leif Mackey DO     magnesium hydroxide suspension 30 mL (MILK OF MAG), 30 mL, Oral, Daily PRN, Steven German MD     magnesium oxide tablet 400 mg (MAG-OX), 400 mg, Oral, TID, Leif Mackey DO, 400 mg at 10/30/17 1304     midodrine tablet 2.5 mg (PROAMATINE), 2.5 mg, Oral, TID with meals, Claudio Frost MD, 2.5 mg at 10/30/17 1304     naloxone injection 0.2-0.4 mg (NARCAN), 0.2-0.4 mg, Intravenous, PRN **OR** naloxone injection 0.2-0.4 mg (NARCAN), 0.2-0.4 mg, Intramuscular, PRN, Leif Mackey, DO     norepinephrine 4  mg/250 ml in D5W (0.016 mg/ml), 0-30 mcg/min, Intravenous, Continuous, Graciela Langston MD, Stopped at 10/30/17 1215     omeprazole capsule 20 mg (PriLOSEC), 20 mg, Oral, QAM AC, Graciela Langston MD, 20 mg at 10/30/17 0918     ondansetron injection 4 mg (ZOFRAN), 4 mg, Intravenous, Q4H PRN **OR** ondansetron tablet 8 mg (ZOFRAN), 8 mg, Oral, Q8H PRN, Steven German MD     oxyCODONE immediate release tablet 10-20 mg (ROXICODONE), 10-20 mg, Oral, Q2H PRN, Leif Mackey DO     piperacillin-tazobactam IVPB 3.375 g (ZOSYN), 3.375 g, Intravenous, Q8H, Steven German MD, Last Rate: 12.5 mL/hr at 10/30/17 0918, 3.375 g at 10/30/17 0918     polyethylene glycol packet 17 g (MIRALAX), 17 g, Oral, Daily PRN, Leif Mackey DO, 17 g at 10/30/17 0917     potassium chloride SA CR tablet 40 mEq (K-DUR,KLOR-CON), 40 mEq, Oral, Q4H, Claudio Frost MD, 40 mEq at 10/30/17 1304     senna-docusate 8.6-50 mg tablet 1 tablet (PERICOLACE), 1 tablet, Oral, BID, Steven German MD, 1 tablet at 10/30/17 0918     sodium chloride 0.9 % flush 10-20 mL (NS), 10-20 mL, Intravenous, PRN, Graciela Langston MD     sodium chloride 0.9 % flush 10-30 mL (NS), 10-30 mL, Intravenous, PRN, Graciela Langston MD     sodium chloride 0.9 % flush 10-30 mL (NS), 10-30 mL, Intravenous, Q8H FIXED TIMES, Graciela Langston MD, 10 mL at 10/30/17 1305     sodium chloride 0.9 % flush 20 mL (NS), 20 mL, Intravenous, PRN, Graciela Langston MD     Insert peripheral IV, , , Once **AND** Saline lock IV, , , Once **AND** sodium chloride 0.9 % flush 3 mL (NS), 3 mL, Intravenous, Line Care, Steven German MD, 10 mL at 10/30/17 0919     sodium chloride 0.9%, 150 mL/hr, Intravenous, Continuous, Steven German MD, Last Rate: 150 mL/hr at 10/30/17 1200, 150 mL/hr at 10/30/17 1200     vancomycin 1,000 mg in sodium chloride 0.9% 250 mL (VANCOCIN), 15 mg/kg, Intravenous, Q12H, Steven German MD, Last Rate: 135 mL/hr at 10/30/17 1427, 1,000 mg at  10/30/17 1427     vasopressin standard infusion 20 units in D5W 100 mL, 2.4 Units/hr, Intravenous, Continuous, Graciela Langston MD, Stopped at 10/29/17 1400    Allergies/intolerances:    No Known Allergies         Review of Systems:  Review of Systems - History obtained from chart review, the patient and spouse  Pain: 0/10  Dyspnea: denies  Fatigue: severe  Nausea:denies  Anorexia: denies  Drowsiness: denies  Constipation: denies  Agitation:denies  Anxiety: denies  Depression: denies    Dementia: n/a   Delirium: n/a  Coma: n/a    Palliative Performance Score:   30%- 1. Totally bed bound; 2. Unable to do any activity, extensive disease; 3. Total care; 4. Normal or reduced; 5. Full or drowsy +/- confusion Physical Exam:  Temp:  [98  F (36.7  C)-99.1  F (37.3  C)] 98.5  F (36.9  C)  Heart Rate:  [] 91  Resp:  [11-32] 21  BP: ()/(44-78) 88/53    General appearance: alert, appears stated age, cooperative, fatigued and no distress  Head: Normocephalic, without obvious abnormality, atraumatic  Eyes: negative, conjunctivae/corneas clear. PERRL, EOM's intact. Fundi benign.  Nose: Nares normal. Septum midline. Mucosa normal. No drainage or sinus tenderness.  Throat: lips, mucosa, and tongue normal; teeth and gums normal  Neck: no adenopathy, no carotid bruit, no JVD, supple, symmetrical, trachea midline and thyroid not enlarged, symmetric, no tenderness/mass/nodules  Lungs: CTA  Heart: regular rate and rhythm, S1, S2 normal, no murmur, click, rub or gallop  Abdomen: soft, non-tender; bowel sounds normal; no masses,  no organomegaly  Pulses: radial pulses palpable +2  Neurologic: Paraplegia; pt reports no sensation from chest down     Pertinent Labs  Lab Results: personally reviewed.   Lab Results   Component Value Date     10/30/2017    K 3.0 (L) 10/30/2017     (H) 10/30/2017    CO2 20 (L) 10/30/2017    BUN 9 10/30/2017    CREATININE 0.42 (L) 10/30/2017    CALCIUM 8.4 (L) 10/30/2017     Lab Results    Component Value Date    WBC 2.7 (L) 10/30/2017    HGB 7.2 (L) 10/30/2017    HCT 21.6 (L) 10/30/2017    MCV 92 10/30/2017    PLT 66 (L) 10/30/2017     AST   Date Value Ref Range Status   10/29/2017 33 0 - 40 U/L Final     ALT   Date Value Ref Range Status   10/29/2017 54 (H) 0 - 45 U/L Final     Alkaline Phosphatase   Date Value Ref Range Status   10/29/2017 156 (H) 45 - 120 U/L Final     Albumin   Date Value Ref Range Status   10/29/2017 1.9 (L) 3.5 - 5.0 g/dL Final        Pertinent Radiology  Radiology Results: Personally reviewed impression/s   Cta Chest Pe Run    Result Date: 10/29/2017  CTA CHEST PE RUN 10/29/2017 3:05 AM INDICATION: Chest pain, acute, PE suspected. TECHNIQUE: Helical acquisition through the chest was performed during the arterial phase of contrast enhancement using IV contrast. 2D and 3D reconstructions were performed by the CT technologist. Dose reduction techniques were used. IV CONTRAST: Iohexol (Omni) 75 mL. COMPARISON: None. FINDINGS: ANGIOGRAM CHEST: Timing of the contrast bolus suboptimal to evaluate for pulmonary emboli. No large or central pulmonary emboli identified. No aortic aneurysm or dissection. LUNGS AND PLEURA: Greater than 20 masses throughout the right hemithorax and larger number within the left hemithorax. Representative lesion left lower lobe which appears to extend through the major fissure measures 4.6 x 2.6 cm on image #85, representative right upper lobe lesion 2.6 x 3.6 cm on image #46, additional confluent lesion left lower lobe measures 4.4 x 3.5 cm on image #101. Small bilateral effusions. Scattered areas of atelectasis, consolidation, and groundglass opacity. MEDIASTINUM: Large bulky left supraclavicular adenopathy incompletely imaged. Representative mass 7.6 x 7.1 cm on the left posteriorly when measured on image #6, additional representative lesion also on the left 5.8 x 4.3 cm on image #3. No mediastinal or hilar adenopathy. Densely calcified 1.8 x 1.4 cm  mass left lobe of thyroid gland likely benign. LIMITED UPPER ABDOMEN: At least 2 hepatic masses, largest 4.5 x 3.9 cm in the right lobe on image #268. Hepatic steatosis. MUSCULOSKELETAL: Postoperative changes in thoracic spine. Degenerative disease. Indeterminate right eighth rib lesion. Destruction of portion of proximal right ninth rib which could be postsurgical. Small sclerotic lesions left fifth and sixth ribs indeterminate.     CONCLUSION: 1.  Evidence for extensive metastatic disease within the supraclavicular region left aspect of the neck, chest, likely bones and liver. If this is an unknown finding PET/CT recommended. 2.  Timing of the contrast bolus suboptimal to evaluate for pulmonary embolus. No pulmonary embolus is identified. No aortic aneurysm or dissection. 3.  Hepatic steatosis.       E/M time/ total time: 110 minutes    >50% of time during encounter was spent with family and patient on counseling and care coordination as documented above. yes

## 2021-06-13 NOTE — PROGRESS NOTES
[facility] Daily Progress Note    Assessment/Plan:  57-year-old male with sarcomatoid metastatic cancer that has been progressing despite chemotherapy and radiation therapies.  Most recently on monoclonal antibiotic treatment, and presented with suspected septic shock.  Patient has presumed healthcare associated pneumonia.  Palliative care and hospice are discussing care options with patient and family.        Septic shock, concern for neurogenic hypotension.   Lactic acidosis.   Suspect pneumonia as source. Treat as HCAP.   Repeat lactic acid level (2.8 yesterday)  Levaquin 750mg iv daily day 2.   Continue dexamethasone 4mg po TID.   Blood cultures NGTD  Midodrine 2.5mg po TID was started to augment his blood pressure.       Respiratory failure, with hypoxia, improved.   No oxygen required at this time.      Sacral redness.   Wound consultation.       Pancytopenia. Likely secondary to chemotherapy, metastatic cancer.   Recent transfusion 3 units of PRBCs during hospitalization at [place] on 26th-27th.   Hemoglobin 8.7  transfused 2 units PRBCs.       Elevated alkaline phosphatase, mild elevation in liver transaminases.   Likely secondary to metastatic disease.       Moderate protein calorie malnutrition      FEN:  Hypomag, replace per protocol.      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   Paraplegia     History of DVT  Lovenox 40mg subcu daily.      Chronic pain  Continue fentanyl, gabapentin, oxycodone prn.     Principal Problem:    Sepsis  Active Problems:    Severe sepsis    Neurogenic orthostatic hypotension    Palliative care encounter    Generalized weakness    Cancer related pain    Shortness of breath     LOS: 3 days     Barriers to discharge: Patient to discuss further options for hospice care with palliative service  Discharge Disposition: Anticipate home later today, patient would benefit from home with hospice and patient and wife are currently  discussing    Subjective:  Patient is sitting up in bed.  He denies any pain.  No fever or chills.  Able to tolerate food.  Overall feels much better than on admission.  He denies any chest pain, shortness of breath, cough.        dexamethasone  4 mg Oral TID     enoxaparin (LOVENOX) injection  40 mg Subcutaneous Q24H     fentaNYL  1 patch Transdermal Q72H     gabapentin  300 mg Oral BID     levoFLOXacin  750 mg Intravenous Q24H     midodrine  2.5 mg Oral TID with meals     omeprazole  20 mg Oral QAM AC     senna-docusate  1 tablet Oral BID     sodium chloride  10-30 mL Intravenous Q8H FIXED TIMES     sodium chloride  3 mL Intravenous Line Care       Objective:  Vital signs in last 24 hours:  Temp:  [97.8  F (36.6  C)-99.9  F (37.7  C)] 99.9  F (37.7  C)  Heart Rate:  [] 89  Resp:  [16-18] 18  BP: ()/(55-78) 109/71  Weight:   Weight:   Wt Readings from Last 1 Encounters:   11/01/17 0405 171 lb 3.2 oz (77.7 kg)   10/31/17 0435 170 lb (77.1 kg)   10/30/17 0430 166 lb (75.3 kg)   10/29/17 0510 152 lb 9.6 oz (69.2 kg)   10/29/17 0138 163 lb (73.9 kg)     Weight change: 1 lb 3.2 oz (0.544 kg)  Body mass index is 29.39 kg/(m^2).    Intake/Output last 3 shifts:  I/O last 3 completed shifts:  In: 5448.5 [P.O.:600; I.V.:4018.5; Blood:680; IV Piggyback:150]  Out: -   Intake/Output this shift:       Review of Systems:   As per subjective, all others negative.    Physical Exam:    General Appearance:  Alert, cooperative, no distress, appears stated age   Head:  Normocephalic, without obvious abnormality, atraumatic   Lungs:   Clear to auscultation bilaterally, respirations unlabored   Chest Wall:  No tenderness or deformity   Heart:  Regular rate and rhythm, S1, S2 normal,no murmur, rub or gallop   Abdomen:   Soft, non-tender, bowel sounds active all four quadrants,  no masses, no organomegaly   Extremities: no cyanosis or edema   Skin: Skin color, texture, turgor normal, no rashes or lesions   Neurologic: Alert and  oriented X 3, Moves all 4 extremities     Imaging:  Personally Reviewed.  Cta Chest Pe Run    Result Date: 10/29/2017  CTA CHEST PE RUN 10/29/2017 3:05 AM INDICATION: Chest pain, acute, PE suspected. TECHNIQUE: Helical acquisition through the chest was performed during the arterial phase of contrast enhancement using IV contrast. 2D and 3D reconstructions were performed by the CT technologist. Dose reduction techniques were used. IV CONTRAST: Iohexol (Omni) 75 mL. COMPARISON: None. FINDINGS: ANGIOGRAM CHEST: Timing of the contrast bolus suboptimal to evaluate for pulmonary emboli. No large or central pulmonary emboli identified. No aortic aneurysm or dissection. LUNGS AND PLEURA: Greater than 20 masses throughout the right hemithorax and larger number within the left hemithorax. Representative lesion left lower lobe which appears to extend through the major fissure measures 4.6 x 2.6 cm on image #85, representative right upper lobe lesion 2.6 x 3.6 cm on image #46, additional confluent lesion left lower lobe measures 4.4 x 3.5 cm on image #101. Small bilateral effusions. Scattered areas of atelectasis, consolidation, and groundglass opacity. MEDIASTINUM: Large bulky left supraclavicular adenopathy incompletely imaged. Representative mass 7.6 x 7.1 cm on the left posteriorly when measured on image #6, additional representative lesion also on the left 5.8 x 4.3 cm on image #3. No mediastinal or hilar adenopathy. Densely calcified 1.8 x 1.4 cm mass left lobe of thyroid gland likely benign. LIMITED UPPER ABDOMEN: At least 2 hepatic masses, largest 4.5 x 3.9 cm in the right lobe on image #268. Hepatic steatosis. MUSCULOSKELETAL: Postoperative changes in thoracic spine. Degenerative disease. Indeterminate right eighth rib lesion. Destruction of portion of proximal right ninth rib which could be postsurgical. Small sclerotic lesions left fifth and sixth ribs indeterminate.     CONCLUSION: 1.  Evidence for extensive  metastatic disease within the supraclavicular region left aspect of the neck, chest, likely bones and liver. If this is an unknown finding PET/CT recommended. 2.  Timing of the contrast bolus suboptimal to evaluate for pulmonary embolus. No pulmonary embolus is identified. No aortic aneurysm or dissection. 3.  Hepatic steatosis.      Lab Results:  Personally Reviewed.     Results from last 7 days  Lab Units 11/01/17  0650 10/31/17  1309 10/31/17  1018   LN-WHITE BLOOD CELL COUNT thou/uL 3.7* 2.9* 2.9*   LN-HEMOGLOBIN g/dL 8.7* 6.2* 6.5*   LN-HEMATOCRIT % 25.8* 18.8* 19.7*   LN-PLATELET COUNT thou/uL 85* 76* 73*       Results from last 7 days  Lab Units 11/01/17  0650 10/31/17  1309 10/31/17  0446 10/30/17  2059 10/30/17  0755 10/29/17  0148   LN-SODIUM mmol/L 139  --  139  --  142 133*   LN-POTASSIUM mmol/L 4.1  --  4.1  4.1 4.3 3.0* 4.3   LN-CHLORIDE mmol/L 108*  --  111*  --  113* 101   LN-CO2 mmol/L 24  --  21*  --  20* 22   LN-BLOOD UREA NITROGEN mg/dL 9  --  11  --  9 22   LN-CREATININE mg/dL 0.41*  --  0.42*  --  0.42* 0.52*   LN-CALCIUM mg/dL 8.6  --  8.2*  --  8.4* 8.9   LN-ALBUMIN g/dL  --   --   --   --   --  1.9*   LN-PROTEIN TOTAL g/dL  --   --   --   --   --  4.8*   LN-BILIRUBIN TOTAL mg/dL  --  0.5  --   --   --  0.9   LN-ALKALINE PHOSPHATASE U/L  --   --   --   --   --  156*   LN-ALT (SGPT) U/L  --   --   --   --   --  54*   LN-AST (SGOT) U/L  --   --   --   --   --  33       Results from last 7 days  Lab Units 10/29/17  0148   LN-INR  1.18*       Total time spent was greater than 35 minutes, greater that 50% of this time was spent in direct evaluation of patient.    Leif Mackey DO, MS  [facility]  Internal Medicine

## 2021-06-13 NOTE — PROGRESS NOTES
[facility] Daily Progress Note    Assessment/Plan:  Severe sepsis. No source confirmed.  Lactic acidosis.   Lactic acid is improved to 2.3 with IV fluids. Continue to trend.   MAPs > 60.   Continue Zosyn, azithromycin and Vancomycin IV.   NS bolus 1 L.  Continue NS 150cc/hr.   Start stress dose hydrocortisone 100mg q8h (home dose 4mg po q8h).   Intensivist consultation.   Blood cultures pending.   UA and culture    Respiratory failure, with hypoxia.   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   No evidence of pneumonia or PE on CT.   Continue supplemental oxygen.   Requested [place] records regarding cancer treatment.     Pancytopenia. Likely secondary to chemotherapy.   Recent transfusion 3 units of PRBCs during hospitalization at [place] on 26th-27th.     Elevated alkaline phosphatase, mild elevation in liver transaminases.   Likely secondary to metastatic disease.   Trend hepatic profile.     Moderate protein calorie malnutrition    FEN:  Hypomag, replaced per protocol.   Hyponatremia: Na 133, monitor.   Continue NS 150cc/hr.     Paraplegia  Secondary to metastatic lesions of spine.     History of DVT  Continue lovenox 100mg subcu Q24h.     Chronic pain  Continue fentanyl, gabapentin, oxycodone prn.     Principal Problem:    Sepsis     LOS: 0 days     Barriers to discharge: Clinical improvement.  Discharge Disposition: pending course.     Subjective:  Patient is lying in bed during our interview.  His eyes are closed but he is responding to questions appropriately.  Patient indicates he is tired from long night.  He denies any chest pain, shortness of breath, nausea, vomiting, cough.  Denies abdominal pain.  No complaints of diarrhea.  Overnight patient's blood pressures have ranged between the low 80s-90s with occasional blood pressure in the 100s.  Patient's last chemotherapy infusion was Friday, October 27.  He received 3 units of packed red blood cells for anemia.    magnesium oxide  400  mg Oral TID     piperacillin-tazobactam (ZOSYN) IV  3.375 g Intravenous Q8H     senna-docusate  1 tablet Oral BID     sodium chloride  3 mL Intravenous Line Care     vancomycin  15 mg/kg Intravenous Q12H       Objective:  Vital signs in last 24 hours:  Temp:  [98.8  F (37.1  C)-101.3  F (38.5  C)] 98.8  F (37.1  C)  Heart Rate:  [] 77  Resp:  [10-27] 10  BP: ()/(52-73) 87/52  Weight:   Weight:   Wt Readings from Last 1 Encounters:   10/29/17 0510 152 lb 9.6 oz (69.2 kg)   10/29/17 0138 163 lb (73.9 kg)     Weight change:   Body mass index is 26.19 kg/(m^2).    Intake/Output last 3 shifts:  I/O last 3 completed shifts:  In: 1500 [I.V.:1000; IV Piggyback:500]  Out: -   Intake/Output this shift:       Review of Systems:   As per subjective, all others negative.    Physical Exam:    General Appearance:  Alert, cooperative, no distress, appears stated age   Head:  Normocephalic, without obvious abnormality, atraumatic   Eyes:  PERRL, conjunctiva/corneas clear, EOM's intact   Nose: Nares normal, septum midline, mucosa normal, no drainage   Throat: Lips, mucosa, and tongue normal; teeth and gums normal   Neck: Supple, symmetrical, trachea midline, no adenopathy, thyroid: not enlarged, symmetric, no carotid bruit or JVD   Back:   Symmetric, no curvature, ROM normal, no CVA tenderness   Lungs:   Clear to auscultation bilaterally, respirations unlabored   Chest Wall:  No tenderness or deformity   Heart:  Regular rate and rhythm, S1, S2 normal,no murmur, rub or gallop   Abdomen:   Soft, non-tender, bowel sounds active all four quadrants,  no masses, no organomegaly   Extremities: Extremities normal, atraumatic, no cyanosis or edema   Skin: Skin color, texture, turgor normal, no rashes or lesions   Neurologic: Alert and oriented X 3, Moves all 4 extremities     Cardiographics:   I personally reviewed.  ECG: Sinus tachycardia, .  No acute ST or T wave changes.     Imaging:  Personally Reviewed.  Cta Chest Pe  Run  Result Date: 10/29/2017  CONCLUSION: 1.  Evidence for extensive metastatic disease within the supraclavicular region left aspect of the neck, chest, likely bones and liver. If this is an unknown finding PET/CT recommended. 2.  Timing of the contrast bolus suboptimal to evaluate for pulmonary embolus. No pulmonary embolus is identified. No aortic aneurysm or dissection. 3.  Hepatic steatosis.      Lab Results:  Personally Reviewed.     Results from last 7 days  Lab Units 10/29/17  0148   LN-WHITE BLOOD CELL COUNT thou/uL 2.7*   LN-HEMOGLOBIN g/dL 8.6*   LN-HEMATOCRIT % 25.3*   LN-PLATELET COUNT thou/uL 68*       Results from last 7 days  Lab Units 10/29/17  0148   LN-SODIUM mmol/L 133*   LN-POTASSIUM mmol/L 4.3   LN-CHLORIDE mmol/L 101   LN-CO2 mmol/L 22   LN-BLOOD UREA NITROGEN mg/dL 22   LN-CREATININE mg/dL 0.52*   LN-CALCIUM mg/dL 8.9   LN-ALBUMIN g/dL 1.9*   LN-PROTEIN TOTAL g/dL 4.8*   LN-BILIRUBIN TOTAL mg/dL 0.9   LN-ALKALINE PHOSPHATASE U/L 156*   LN-ALT (SGPT) U/L 54*   LN-AST (SGOT) U/L 33       Results from last 7 days  Lab Units 10/29/17  0148   LN-INR  1.18*       Total time spent was greater than 35 minutes, greater that 50% of this time was spent in direct evaluation of patient.    Leif Mackey DO, MS  [facility][1] Service  Internal Medicine

## 2021-06-13 NOTE — PROGRESS NOTES
"  Clinical Nutrition Therapy Assessment Note    Reason for Assessment:   Shoaib Parish is a 57 y.o. male assessed by the registered Dietitian for nutrition risk screen and wound.      Cancer with metastasis    Lower extremity paraplegia secondary to his cancer    Last chemotherapy was one month ago    Nutrition History:  Information obtained from chart.  Recent food/fluid intake has been good.   Patient has the following food allergies or intolerances: NKA    Current Nutrition Prescription:   Diet: Regular    IV dextrose or Fluids:  norepinephrine IV infusion in D5W Last Rate: 1 mcg/min (10/30/17 0906)   nacl 0.9% Last Rate: 150 mL/hr (10/30/17 0800)   vasopressin Last Rate: Stopped (10/29/17 1400)     Current Nutrition Intake:  The patient's current meal intake is good > 75%     Anthropometrics:  Height: 5' 4\" (162.6 cm)  Weight: 166 lb (75.3 kg)  BMI (Calculated): 26.2  BMI indication: 25-29.9 overweight  Ideal body weight 130 lbs  % Ideal body weight  128%    Physical Findings:  The patient has the following physical signs which could indicate malnutrition: reduced  strength, delayed wound healing.    GI Status/Output:   The patient's GI function WDL per nursing notations  Bowel Sounds present    Skin/Wound:  Dandre score Dandre Scale Score: 11  Pressure ulcer/Decubitus ulcer Coccyx was noted.    Medications:  Medications reviewed.    Labs:  Results for SHOAIB PARISH (MRN [id number])    Ref. Range 10/30/2017 07:55   Potassium Latest Ref Range: 3.5 - 5.0 mmol/L 3.0 (L)   Hemoglobin  7.2 (L)       Noted patient on Mg++ replacement    Assessed Nutritional Needs:  Assessment weight is 59 kg, with a weight source of ideal weight    Estimated Energy Needs: 1500 to 1775 kcals daily per 25 to 30 kcal/kg     Estimated Protein Needs: 75 to 88 g daily, to 1.3 to 1.5  g/kg (20% kcals)     Estimated Fluid Needs: 1775 to 2065 mls daily, 30 to 35 mls/kg  This is also @ 25 mls/kg actual weight    Malnutrition: Noted " previously   MD documented Moderate protein calorie malnutrition.    Nutrition Risk Level: high risk    Nutrition dx: Nutrition dx: Non severe Moderate Malnutrition related to Metastatic sarcomatoid cancer  With wound and weakness now on Hospice.      Goals:    Consume > 75% at meals    Bowel function WDL    Maintain weight while hospitalized    Intervention / Monitoring:    Provide select menu.    Honor food preferences    Provide snacks on demand within diet prescription    Follow PO intake, weight, labs

## 2021-06-13 NOTE — CONSULTS
PULMONARY/CRITICAL CARE PROGRESS NOTE    Date / Time of Admission:  10/29/2017  1:29 AM  Assessment:   57yoM with rapidly progressive sarcomatoid carcinoma of unknown primary with involvement of epidural space at T9 causing paraplegia, carcinomatous meningitis, pulmonary mets who presents with septic shock from presumed pneumonia.    Principal Problem:    Sepsis        Plan:   Oncology:  1) Sarcomatoid Carcinoma of unknown primary  2) Pancytopenia  3) Bilateral DVT  -Transfuse for HGB<7, Plts <50  -Holding on further Pembrolizumab, last received 2 days ago  -Continue Lovenox    C/V:  1) Septic Shock in combination with neurogenic. He runs low at baseline per wife.   -MAP goal >55 so long as patient is mentating and making urine  -Volume resuscitated with 4L fluid  -Vasopressors next via PICC if needed--patient and wife are deciding if this is what they want. Also want to run this by Dr. Garcia.  -Stress-dose steroids (on decadron chronically for spinal cord)    ID:  1) Severe sepsis versus Septic shock, ?pneumonia as source. Also possibly a reaction to the Pembrolizumab.  -Awaiting UA  -Vanc/Zosyn/Azithro for atypical coverage as well in an immunocompromised patient  -Blood cultures pending  -No sputum being produced  -Sacrum with unstageable area-no skin break down--Wound consult.    Pulmonary:  1) HCAP  2) Pulmonary mets  -No O2 requirement currently, watch closely    GI:  1) History of radiation-induced esophagitis  -PPI    I introduced myself to patient, wife, brother at bedside. Explained that we believe this is a serious infection causing his hypotension, fever. Was discussing possibility of vasopressors--wife requesting Dr. Jose drummond this first. Then asking about transferring to [place] and when she would have to decide. I explained it would depend on them accepting him for transfer. Finally she asked when he would be discharged since she was told 1-2 days. I explained that he was critically ill here in the iCU  and that I don't have a discharge date, he has to stabilize first. Towards the end she became tearful but didn't have additional questions. Will attempt to reach out to Dr. Garcia today (sunday).     >75 minutes spent critical care time.     Subjective:    Cc: dyspnea    HPI: 57 y.o. male with history of sarcomatoid carcinoma of unknown primary with rapidly progressing course (just diagnosed 7/2017) with pathologic T9 compression fracture and epidural mets causing paraplegia from T9 presents from home. Patient was sleeping and making gurgling noises, wife noticed this. Found to be febrile in ED with BPs in the 80s. Responsive to fluid but then drifts down again once fluid boluses stop.    His course has been complex. He presented with CT scan 1/2017 that found pulmonary nodules. He then developed weakness and compression fracture at T9 was discovered. Repeat CT scan done in 6/2017 found enlarging pulmonary mets and confirmed T9 vertebral body mass. MRI subsequent found extension of mass into epidural space causing severe spinal canal narrowing. He underwent CT guided biopsy of T9 mass 6/2017 that found high grade malignant mneoplasm with spindle and epithelioid features. End of June he underwent T8-->T10 decompressive laminectmy with Fusion T7-->T11. He underwent Radiation therapy at T6-->T11 mid July but developed progressive weakness despite this. New Epidural tumor in the left thecal sac at T12-->L1 discovered and no further surgery recommended. He then underwent radiation to T6-->L2. PET done 8/2017 found PET avid soft tissue mass in spinal canal at T7 and ongoing progression of pulmonary masses. He started Carboplatin and paclitaxel in August and continued through 10/2/17. Repeat CT scan done 10/4/17 found possible liver met as well as increase in pulmonary mets and increase in paraspinal soft tissue mass all consistent with progression despite chemotherapy. Over the past few weeks end of September into October,  "he developed paralysis of his lower extremities and now onset of numbess in arms with weakness in right arm. Ongoing issues with pain.     He last saw Dr. Horta at [facility] 10/11 where he underwent 1 dose of radiation. He has also been receiving the Pembrolizumab at [facility].    Past Medical History:   Diagnosis Date     Carcinomatous meningitis     based on PET from 2017     DVT, bilateral lower limbs     2017, on Lovenox     Multiple pulmonary nodules     Mets from Sarcomatoid Carcinoma     Paraplegia at T9 level     secondary to epidural mets s/p T9 Laminectomy with T7-T11 rusion     Radiation-induced esophagitis     Seen on EGD 2017     Sarcoma of bone     \"Sarcomatoid Carcinoma with pathologic T9 compression fracture, epidural mets, pulmonary mets\"       Social History   Substance Use Topics     Smoking status: Never Smoker     Smokeless tobacco: Never Used     Alcohol use No       Family History   Problem Relation Age of Onset     Cancer Maternal Aunt       in 20s, unknown type       No current facility-administered medications on file prior to encounter.      No current outpatient prescriptions on file prior to encounter.         Review of Systems: Reviewed and negative aside from that noted in HPI.    Objective:    Vital signs:  Vitals:    10/29/17 0830   BP: (!) 82/53   Pulse: 80   Resp: 9   Temp:    SpO2: 96%   RA  General appearance: alert, appears stated age and cooperative  Neck: no adenopathy and supple, symmetrical, trachea midline  Lungs: clear to auscultation bilaterally  Heart: regular rate and rhythm, S1, S2 normal, no murmur, click, rub or gallop  Abdomen: soft, non-tender; bowel sounds normal; no masses,  no organomegaly  Extremities: extremities normal, atraumatic, no cyanosis or edema  Skin: Skin color, texture, turgor normal. No rashes or lesions  Neurologic: good use of hands and arms, oriented and appropriate. Not able to move legs.    Graciela Langston MD      Data    CT " chest: personally reviewed. Multiple pulmonary nodules throughout.     Laboratory:  No results found for this or any previous visit.  Lab Results   Component Value Date    WBC 2.7 (L) 10/29/2017    HGB 8.6 (L) 10/29/2017    HCT 25.3 (L) 10/29/2017    MCV 90 10/29/2017    PLT 68 (L) 10/29/2017     Lactate 3.1-->2.3

## 2021-06-13 NOTE — PROGRESS NOTES
Removed patient's home fentanyl patch from right side. Placed new fentanyl patch on patient's left side. Witnessed disposal of home dose in hoper by Yenny Johnson RN.

## 2021-06-13 NOTE — PROGRESS NOTES
"Update:    Lactate came back at 4.1 up from 2.3. Dr. Mackey and myself met with patient's wife and brother, abe. Explained that he is not purfusing and this is an indication for vasopressors via PICC line. Consented wife and patient.    Questions about septic shock and the possibility of recovery. I explained that his mortality risk is elevated due to his advanced cancer coming into this but there is a possibility that with vasopressor support and antibiotics, he could turn around from this illness but still has the aggressive underlying cancer to face. In addition-he will not receive chemo or antibody therapy for the time being as well.    I think she is aware of his poor prognosis but was \"hoping for a miracle.\" She did ask if he could die at home with his family surrounding him and we explained that currently we cannot guarantee that.     Graciela Langston MD  Electronically signed on 10/29/2017 1:25 PM      "

## 2021-06-13 NOTE — PROGRESS NOTES
"Brief Update    Spoke with Dr. Garcia's coverage over the weekend Dr. Gomez from [facility].    Updated him regarding sepsis and our current treatment plan with fluids/abx.    Explained that wife wanted Dr. Garcia to \"ok\" use of vasopressors. We are thinking the hypotension is sepsis +/- neurogenic from his spinal cord lesion and therefore have relaxed our MAP goals to 55 or greater so long as he is mentating and making urine. Dr. Gomez does not diretly follow this patient. He did tell me he thought that it was unlikely this was reaction to the Pembrolizumab given lack of pneumonitis seen on CT scan. He felt it would be reasonable to do a trial of vasopressors should he worsen and need them but overall the prognosis is poor given the rapidity of his disease progression.    I am unable to reach Dr. Garcia as wife asked, this was her coverage.    Graciela Langston MD  Electronically signed on 10/29/2017 11:38 AM      "

## 2021-06-13 NOTE — PROGRESS NOTES
[facility] Daily Progress Note    Assessment/Plan:  57-year-old male with sarcomatoid metastatic cancer that has been progressing despite chemotherapy and radiation therapies.  Most recently on monoclonal antibiotic treatment, and presented with suspected septic shock.  Patient has presumed healthcare associated pneumonia.  Palliative care and hospice are discussing care options with patient and family.       Septic shock, concern for neurogenic hypotension.   Lactic acidosis.   Suspect pneumonia as source. Treat as HCAP.   Repeat lactic acid level (3.4 yesterday)  Discontinue Zosyn, azithromycin and Vancomycin IV.   Start Levaquin 750mg iv daily.   Continue dexamethasone 4mg po TID.   Blood cultures NGTD  UA and culture was not able to be obtained.   Midodrine 2.5mg po TID was started to augment his blood pressure.       Respiratory failure, with hypoxia, improved.   No oxygen required at this time.     Sacral redness.   Wound consultation.      Pancytopenia. Likely secondary to chemotherapy.   Recent transfusion 3 units of PRBCs during hospitalization at [place] on 26th-27th.   Hemoglobin 6.5.   No evidence of hemolysis on labs.   Will transfuse 2 units PRBCs.       Elevated alkaline phosphatase, mild elevation in liver transaminases.   Likely secondary to metastatic disease.       Moderate protein calorie malnutrition      FEN:  Hypomag, replace per protocol.   Continue NS 150cc/hr.         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   Paraplegia    History of DVT  Hold lovenox 100mg subcu Q24h.   Awaiting CBC, patient with thrombocytopenia.       Chronic pain  Continue fentanyl, gabapentin, oxycodone prn.     Principal Problem:    Sepsis  Active Problems:    Severe sepsis    Neurogenic orthostatic hypotension    Palliative care encounter    Generalized weakness    Cancer related pain     LOS: 2 days     Barriers to discharge: Clinical improvement.  Patient would benefit from hospice.   Patient with very poor prognosis, and unlikely benefit from further chemotherapy or radiation treatment.   Discharge Disposition: Home, with discussions today, anticipate home with hospice.     Subjective:  Shoaib is laying in bed during our interview.  He reports he feels better than he did yesterday.  Denies any chest pain, shortness of breath, cough.  No abdominal pain, nausea or vomiting.  Patient is incontinent of stool and urine chronically.  Apparently we were unable to obtain a urinalysis.  Straight catheterization was offered but declined by family.  In discussions today with Shoaib and his wife, we discussed options for further treatment and likelihood of further treatment given recent episode of septic shock and infection.  I indicated would be many weeks prior to initiating any further chemotherapy treatment if this was even warranted or recommended by his oncologist.  Patient's wife indicated she wished to contact her oncologist and discuss options further.  It seems Shoaib and his wife are leaning towards home with hospice with a short course of antibiotic treatment for suspected pneumonia.      azithromycin  500 mg Intravenous Q24H     dexamethasone  4 mg Oral TID     enoxaparin  100 mg Subcutaneous DAILY     fentaNYL  1 patch Transdermal Q72H     gabapentin  300 mg Oral BID     magnesium sulfate IVPB  4 g Intravenous Once     midodrine  2.5 mg Oral TID with meals     omeprazole  20 mg Oral QAM AC     piperacillin-tazobactam (ZOSYN) IV  3.375 g Intravenous Q8H     senna-docusate  1 tablet Oral BID     sodium chloride  10-30 mL Intravenous Q8H FIXED TIMES     sodium chloride  3 mL Intravenous Line Care     vancomycin  15 mg/kg Intravenous Q12H       Objective:  Vital signs in last 24 hours:  Temp:  [98.1  F (36.7  C)-101.3  F (38.5  C)] 98.1  F (36.7  C)  Heart Rate:  [] 89  Resp:  [14-24] 16  BP: ()/(53-64) 95/56  Weight:   Weight:   Wt Readings from Last 1 Encounters:   10/31/17 0435 170 lb  (77.1 kg)   10/30/17 0430 166 lb (75.3 kg)   10/29/17 0510 152 lb 9.6 oz (69.2 kg)   10/29/17 0138 163 lb (73.9 kg)     Weight change: 4 lb (1.814 kg)  Body mass index is 29.18 kg/(m^2).    Intake/Output last 3 shifts:  I/O last 3 completed shifts:  In: 5585.3 [P.O.:1200; I.V.:3715.3; IV Piggyback:670]  Out: -   Intake/Output this shift:       Review of Systems:   As per subjective, all others negative.    Physical Exam:    General Appearance:  Alert, cooperative, no distress, appears stated age   Head:  Normocephalic, without obvious abnormality, atraumatic   Neck: Supple, symmetrical, trachea midline, no adenopathy, thyroid: not enlarged, symmetric, no carotid bruit or JVD   Back:   Symmetric, no curvature, thoracic incision scar is clean, bandage or right sacrum noted.    Lungs:   Clear to auscultation bilaterally, respirations unlabored   Chest Wall:  No tenderness or deformity   Heart:  Regular rate and rhythm, S1, S2 normal,no murmur, rub or gallop   Abdomen:   Soft, non-tender, bowel sounds active all four quadrants,  no masses, no organomegaly   Extremities: Extremities normal, atraumatic, no cyanosis or edema   Skin: Skin color, texture, turgor normal, no rashes or lesions   Neurologic: Alert and oriented X 3, Moves all 4 extremities     Cardiographics:   I personally reviewed.  ECG: Sinus tachycardia, .  No acute ST or T wave changes.     Imaging:  Personally Reviewed.  Cta Chest Pe Run  Result Date: 10/29/2017  CONCLUSION: 1.  Evidence for extensive metastatic disease within the supraclavicular region left aspect of the neck, chest, likely bones and liver. If this is an unknown finding PET/CT recommended. 2.  Timing of the contrast bolus suboptimal to evaluate for pulmonary embolus. No pulmonary embolus is identified. No aortic aneurysm or dissection. 3.  Hepatic steatosis.    Lab Results:  Personally Reviewed.     Results from last 7 days  Lab Units 10/30/17  075 10/29/17  0148   LN-WHITE BLOOD  CELL COUNT thou/uL 2.7* 2.7*   LN-HEMOGLOBIN g/dL 7.2* 8.6*   LN-HEMATOCRIT % 21.6* 25.3*   LN-PLATELET COUNT thou/uL 66* 68*       Results from last 7 days  Lab Units 10/31/17  0446 10/30/17  2059 10/30/17  0755 10/29/17  0148   LN-SODIUM mmol/L 139  --  142 133*   LN-POTASSIUM mmol/L 4.1  4.1 4.3 3.0* 4.3   LN-CHLORIDE mmol/L 111*  --  113* 101   LN-CO2 mmol/L 21*  --  20* 22   LN-BLOOD UREA NITROGEN mg/dL 11  --  9 22   LN-CREATININE mg/dL 0.42*  --  0.42* 0.52*   LN-CALCIUM mg/dL 8.2*  --  8.4* 8.9   LN-ALBUMIN g/dL  --   --   --  1.9*   LN-PROTEIN TOTAL g/dL  --   --   --  4.8*   LN-BILIRUBIN TOTAL mg/dL  --   --   --  0.9   LN-ALKALINE PHOSPHATASE U/L  --   --   --  156*   LN-ALT (SGPT) U/L  --   --   --  54*   LN-AST (SGOT) U/L  --   --   --  33       Results from last 7 days  Lab Units 10/29/17  0148   LN-INR  1.18*       Total time spent was greater than 35 minutes, greater that 50% of this time was spent in direct evaluation of patient.    Leif Mackey DO, MS  [facility] Service  Internal Medicine

## 2021-06-13 NOTE — PROGRESS NOTES
"Pharmacy Consult: Vancomycin Dosing    Pharmacist consulted to dose vancomycin for Shoaib Parish, a 57 y.o. male.    Ordering provider: Dr German    Indication for vancomycin therapy: Sepsis    Goal Trough Range:  15-20 mcg/mL based on indication    Other current antimicrobials             vancomycin 1,000 mg in sodium chloride 0.9% 250 mL (VANCOCIN)  Every 12 hours          piperacillin-tazobactam IVPB 3.375 g (ZOSYN)  Every 8 hours             Subjective/Objective:    Patient was admitted for Sepsis on 10/29/2017    Height: 5' 4\" (1.626 m)    Actual Body Weight (ABW): 69.2 kg (152 lb 9.6 oz)    Ideal body weight: 59.2 kg (130 lb 8.2 oz)  Adjusted ideal body weight: 63.2 kg (139 lb 5.6 oz)    BMI: Body mass index is 26.19 kg/(m^2).    No Known Allergies    Patient Active Problem List   Diagnosis     Sepsis    No past medical history on file.     Temp Readings from Current Encounter:     10/29/17 0138 10/29/17 0510   Temp: (!) 101.3  F (38.5  C) 98.8  F (37.1  C)     Net Intake/Output (last 24 hours):       Recent Labs      10/29/17   0148   WBC  2.7*   LACTICACID  3.1*   BUN  22   CREATININE  0.52*     Estimated Creatinine Clearance: 151.6 mL/min (by C-G formula based on Cr of 0.52).    No results for input(s): CULTURE in the last 72 hours.    No results found for any visits on 10/29/17.    No results for input(s): VANCOMYCIN in the last 168 hours.    Vancomycin administrations: (last 120 hours)     Date/Time Action Medication Dose Rate    10/29/17 0233 New Bag    vancomycin 1.25 g in sodium chloride 0.9% 500 mL (VANCOCIN) 1.25 g 262.5 mL/hr          Assessment/Plan:    Pharmacist consulted to dose vancomycin for Sepsis, goal trough range 15-20 mcg/mL.  1. Continue vancomycin 1000mg IV every 12 hours (15 mg/kg actual body weight) (patient has lower extremity paraplegia - thus pharmacy will follow closely as srcr may not be a true indication of renal function)  2. Decentralized pharmacist to follow up this AM " and adjust dose/draw level as appropriate.  3. Pharmacist will continue to follow.    Thank you for the consult.  Scottie Barragan, PharmD 10/29/2017 6:03 AM

## 2021-06-16 PROBLEM — R53.1 GENERALIZED WEAKNESS: Status: ACTIVE | Noted: 2017-01-01

## 2021-06-16 PROBLEM — G89.3 CANCER RELATED PAIN: Status: ACTIVE | Noted: 2017-01-01

## 2021-06-16 PROBLEM — A41.9 SEPSIS (H): Status: ACTIVE | Noted: 2017-01-01

## 2021-06-16 PROBLEM — Z51.5 PALLIATIVE CARE ENCOUNTER: Status: ACTIVE | Noted: 2017-01-01

## 2021-06-25 NOTE — PROGRESS NOTES
Progress Notes by Adal Bhakta PharmD at 10/29/2017  7:37 AM     Author: Adal Bhakta PharmD Service: Pharmacy Author Type: Pharmacy Intern    Filed: 10/29/2017  7:39 AM Date of Service: 10/29/2017  7:37 AM Status: Attested    : Adal Bhakta PharmD (Pharmacist)    Related Notes: Original Note by Adal Bhakta PharmD (Pharmacist) filed at 10/29/2017  7:38 AM    Cosigner: Isabella Aquino PharmD at 10/29/2017  8:00 AM    Attestation signed by Isabella Aquino PharmD at 10/29/2017  8:00 AM    Although I was not present for the medication history interview, to my knowledge, the intern has compiled the PTA medication list to the best of his/her ability given the information available at the present time.    Isabella Lala PharmD     10/29/2017     8:00 AM                    Pharmacy Note - Admission Medication History    Pertinent Provider Information: NA     ______________________________________________________________________    Prior To Admission (PTA) med list completed and updated in EMR.       PTA Med List   Medication Sig Note Last Dose   ? dexamethasone (DECADRON) 4 MG tablet Take 4 mg by mouth 3 (three) times a day.  10/28/2017 at Unknown time   ? docusate sodium (COLACE) 100 MG capsule Take 100 mg by mouth 2 (two) times a day as needed for constipation.  10/28/2017 at Unknown time   ? enoxaparin (LOVENOX) 100 mg/mL Syrg Inject 100 mg under the skin daily. 10/29/2017: Hx of DVT 10/28/2017 at Unknown time   ? fentaNYL (DURAGESIC) 75 mcg/hr Place 1 patch on the skin every third day. 10/29/2017: ON NOW - Day 2 10/28/2017 at ON NOW   ? gabapentin (NEURONTIN) 300 MG capsule Take 300 mg by mouth 2 (two) times a day.  10/28/2017 at Unknown time   ? levoFLOXacin (LEVAQUIN) 500 MG tablet Take 500 mg by mouth daily.  10/28/2017 at PM   ? LORazepam (ATIVAN) 0.5 MG tablet Take 0.5-1 mg by mouth every 4 (four) hours as needed for anxiety.   Past Week at Unknown time   ? oxyCODONE (ROXICODONE) 10 mg immediate release  tablet Take 10-20 mg by mouth every 2 (two) hours as needed for pain. 10/29/2017: Fill history shows oxycontin 20 mg CR filled but patient's wife states they only use IR tabs   10/28/2017 at Unknown time   ? polyethylene glycol (MIRALAX) 17 gram packet Take 17 g by mouth daily as needed.   Past Week at Unknown time   ? prochlorperazine (COMPAZINE) 10 MG tablet Take 10 mg by mouth every 4 (four) hours as needed for nausea (every 4-6 hours as needed).  Past Week at Unknown time   ? senna-docusate (SENNOSIDES-DOCUSATE SODIUM) 8.6-50 mg tablet Take 1 tablet by mouth 2 (two) times a day as needed for constipation.  10/28/2017 at Unknown time       Information source(s): Patient, Family member and Patient's pharmacy    Summary of Changes to PTA Med List  New: NA  Discontinued: NA  Changed: Updated lorazepam and miralax from daily to prn    Patient was asked about OTC/herbal products specifically.  PTA med list reflects this.    Based on the pharmacists assessment, the PTA med list information appears reliable    Patient appears adherent: Yes    Allergies were reviewed, assessed, and updated with the patient.      Patient does not anticipate needing any multi-use medications during admission.    Thank you for the opportunity to participate in the care of this patient.    Adal Bhakta, Pharmacy Intern  10/29/2017 7:38 AM

## 2021-07-01 NOTE — PROCEDURES
"Procedures by Checo Ladd RN at 10/29/2017  3:01 PM     Author: Checo Ladd RN Service: -- Author Type: Registered Nurse    Filed: 10/29/2017  3:03 PM Date of Service: 10/29/2017  3:01 PM Status: Signed    : Checo Ladd RN (Registered Nurse)     Procedure Orders    1. Insert PICC [[id number]] ordered by Graciela Langston MD at 10/29/17 1303           Procedures    1. PICC AND MIDLINE TEAM LINE INSERTION [IVT34 (Custom)]             PICC Line Insertion Procedure Note  Pt. Name: Shoaib Parish  MRN:        [id number]    Procedure: Insertion of a  triple Lumen  5 fr  Bard SOLO (valved) Power PICC, Lot number [id number]    Indications: vasopressors    Contraindications : none noted    Procedure Details   Patient identified with 2 identifiers and \"Time Out\" conducted.  .     Central line insertion bundle followed: hand hygeine performed prior to procedure, site cleansed with cholraprep, hat, mask, sterile gloves,sterile gown worn, patient draped with maximum barrier head to toe drape, sterile field maintained.    The vein was assessed and found to be compressible and of adequate size. 3 ml 1% Lidocaine administered sq to the insertion site. A 5 Fr PICC was inserted into the basilic vein of the right arm with ultrasound guidance. One attempt(s) required to access vein.   Catheter threaded without difficulty. Good blood return noted.    Modified Seldinger Technique used for insertion.    The 8 sharps that are included in the PICC insertion kit were accounted for and disposed of in the sharps container prior to breakdown of the sterile field.    Catheter secured with Statlock, biopatch and Tegaderm dressing applied.    Findings:  Total catheter length  36 cm, with 0 cm exposed. Mid upper arm circumference is 32 cm. Catheter was flushed with 30 cc NS. Patient  tolerated procedure well.    Tip placement verified by [email or web address] tip location technology . Tip placement in the SVC.    CLABSI prevention brochure left at " bedside.    Patient's primary RN notified PICC is ready for use.    Comments:          Checo Ladd, RN    [facility]  [phone]